# Patient Record
Sex: MALE | Race: WHITE | Employment: FULL TIME | URBAN - METROPOLITAN AREA
[De-identification: names, ages, dates, MRNs, and addresses within clinical notes are randomized per-mention and may not be internally consistent; named-entity substitution may affect disease eponyms.]

---

## 2017-01-05 RX ORDER — LEVOTHYROXINE SODIUM 0.05 MG/1
50 TABLET ORAL
Qty: 30 TABLET | Refills: 3 | Status: SHIPPED | OUTPATIENT
Start: 2017-01-05 | End: 2017-03-02 | Stop reason: DRUGHIGH

## 2017-01-06 ENCOUNTER — OFFICE VISIT (OUTPATIENT)
Dept: ENDOCRINOLOGY CLINIC | Facility: CLINIC | Age: 52
End: 2017-01-06

## 2017-01-06 VITALS
WEIGHT: 204.19 LBS | DIASTOLIC BLOOD PRESSURE: 71 MMHG | RESPIRATION RATE: 18 BRPM | HEART RATE: 78 BPM | TEMPERATURE: 99 F | BODY MASS INDEX: 32.82 KG/M2 | SYSTOLIC BLOOD PRESSURE: 114 MMHG | HEIGHT: 66 IN

## 2017-01-06 DIAGNOSIS — E06.3 HYPOTHYROIDISM DUE TO HASHIMOTO'S THYROIDITIS: ICD-10-CM

## 2017-01-06 DIAGNOSIS — E03.8 HYPOTHYROIDISM DUE TO HASHIMOTO'S THYROIDITIS: ICD-10-CM

## 2017-01-06 DIAGNOSIS — E08.00 DIABETES MELLITUS DUE TO UNDERLYING CONDITION WITH HYPEROSMOLARITY WITHOUT COMA, UNSPECIFIED LONG TERM INSULIN USE STATUS: Primary | ICD-10-CM

## 2017-01-06 LAB
GLUCOSE BLOOD: 195
TEST STRIP LOT #: NORMAL NUMERIC

## 2017-01-06 PROCEDURE — 36416 COLLJ CAPILLARY BLOOD SPEC: CPT | Performed by: INTERNAL MEDICINE

## 2017-01-06 PROCEDURE — 99214 OFFICE O/P EST MOD 30 MIN: CPT | Performed by: INTERNAL MEDICINE

## 2017-01-06 PROCEDURE — 82962 GLUCOSE BLOOD TEST: CPT | Performed by: INTERNAL MEDICINE

## 2017-01-06 RX ORDER — ESCITALOPRAM OXALATE 10 MG/1
10 TABLET ORAL DAILY
Refills: 0 | COMMUNITY
Start: 2017-01-01 | End: 2018-12-07

## 2017-01-06 NOTE — PROGRESS NOTES
Name: Alberto Beltran  Date: 1/6/2017    Referring Physician: No ref. provider found    HISTORY OF PRESENT ILLNESS   Alberto Beltran is a 46year old male who presents for diabetes mellitus.   He has history of chronic pancreatitis s/p multiple ERCP and tara tablet, Rfl: 3  •  Glucose Blood (CRISTIAN CONTOUR NEXT TEST) In Vitro Strip, Use to check blood sugar 5 times daily as directed, Disp: 450 each, Rfl: 0  •  CRISTIAN CONTOUR MONITOR W/DEVICE Does not apply Kit, Use to check blood sugar, Disp: 1 kit, Rfl: 0  •  B 1.0 oz/week       2 Glasses of wine per week       Comment: once/month    Drug Use: No            Other Topics            Concern  Caffeine Concern        Yes    Comment:6cups coffee        Medical History:   Past Medical History   Diagnosis Date and cardiovascular disease  -Discussed importance of SBGM  -Discussed importance of low CHO diet  -Adjusted insulin pump as noted above  -Discussed importance of bolus several times per day - overall improved compliance  -Normotensive  -labs improved, cont

## 2017-02-10 RX ORDER — INSULIN ASPART 100 [IU]/ML
INJECTION, SOLUTION INTRAVENOUS; SUBCUTANEOUS
Qty: 50 VIAL | Refills: 2 | Status: SHIPPED | OUTPATIENT
Start: 2017-02-10 | End: 2017-07-08

## 2017-02-27 ENCOUNTER — TELEPHONE (OUTPATIENT)
Dept: ENDOCRINOLOGY CLINIC | Facility: CLINIC | Age: 52
End: 2017-02-27

## 2017-02-27 DIAGNOSIS — E03.9 HYPOTHYROIDISM, UNSPECIFIED TYPE: Primary | ICD-10-CM

## 2017-02-27 NOTE — TELEPHONE ENCOUNTER
Please call patient - thyroid levels are still not at goal, increase LT4 to 75mcg PO daily and repeat TSH, FT4 in 2 months.

## 2017-03-02 ENCOUNTER — PATIENT MESSAGE (OUTPATIENT)
Dept: ENDOCRINOLOGY CLINIC | Facility: CLINIC | Age: 52
End: 2017-03-02

## 2017-03-02 RX ORDER — LEVOTHYROXINE SODIUM 0.07 MG/1
75 TABLET ORAL
Qty: 30 TABLET | Refills: 3 | Status: SHIPPED | OUTPATIENT
Start: 2017-03-02 | End: 2017-07-03

## 2017-03-02 NOTE — TELEPHONE ENCOUNTER
Have had trouble reaching patient. Sent [employer] message and will await reply before sending prescription. Will place orders for repeat labs in 2 months.

## 2017-03-15 ENCOUNTER — TELEPHONE (OUTPATIENT)
Dept: ENDOCRINOLOGY CLINIC | Facility: CLINIC | Age: 52
End: 2017-03-15

## 2017-03-15 NOTE — TELEPHONE ENCOUNTER
Called Mulugeta Dias and let him know recommendation below. He states pain is not severe at this time.  He will keep recommendation in mind but plans to contact Dr. Belgica Whiting office as well as he has seen them in the past. IF symptoms worsen he understands to go to E

## 2017-03-15 NOTE — TELEPHONE ENCOUNTER
We could do labs. but I would recommend going to the ER, especially since this is his second episode in less than 2 months.    yuliya

## 2017-03-15 NOTE — TELEPHONE ENCOUNTER
Regarding: RE: Non-Urgent Medical Question  Contact: [phone]  ----- Message from Jere Henning RN sent at 3/15/2017 12:07 PM CDT -----       ----- Message sent from Rosa Maria Gomez RN to Bret Cancino at 3/15/2017 11:56 AM -----   ARIN Cordoba

## 2017-04-20 ENCOUNTER — CHARTING TRANS (OUTPATIENT)
Dept: OTHER | Age: 52
End: 2017-04-20

## 2017-04-23 ENCOUNTER — CHARTING TRANS (OUTPATIENT)
Dept: OTHER | Age: 52
End: 2017-04-23

## 2017-05-30 RX ORDER — ATORVASTATIN CALCIUM 40 MG/1
TABLET, FILM COATED ORAL
Qty: 90 TABLET | Refills: 1 | Status: SHIPPED | OUTPATIENT
Start: 2017-05-30 | End: 2017-12-08

## 2017-05-30 RX ORDER — LISINOPRIL 40 MG/1
TABLET ORAL
Qty: 90 TABLET | Refills: 1 | Status: SHIPPED | OUTPATIENT
Start: 2017-05-30 | End: 2017-12-08

## 2017-07-05 RX ORDER — LEVOTHYROXINE SODIUM 0.07 MG/1
TABLET ORAL
Qty: 30 TABLET | Refills: 0 | Status: SHIPPED | OUTPATIENT
Start: 2017-07-05 | End: 2017-07-31

## 2017-07-08 ENCOUNTER — OFFICE VISIT (OUTPATIENT)
Dept: ENDOCRINOLOGY CLINIC | Facility: CLINIC | Age: 52
End: 2017-07-08

## 2017-07-08 VITALS
DIASTOLIC BLOOD PRESSURE: 80 MMHG | HEIGHT: 67 IN | SYSTOLIC BLOOD PRESSURE: 114 MMHG | BODY MASS INDEX: 31.08 KG/M2 | WEIGHT: 198 LBS | HEART RATE: 78 BPM

## 2017-07-08 DIAGNOSIS — E11.65 UNCONTROLLED TYPE 2 DIABETES MELLITUS WITH COMPLICATION, WITH LONG-TERM CURRENT USE OF INSULIN (HCC): Primary | ICD-10-CM

## 2017-07-08 DIAGNOSIS — Z79.4 UNCONTROLLED TYPE 2 DIABETES MELLITUS WITH COMPLICATION, WITH LONG-TERM CURRENT USE OF INSULIN (HCC): Primary | ICD-10-CM

## 2017-07-08 DIAGNOSIS — E11.8 UNCONTROLLED TYPE 2 DIABETES MELLITUS WITH COMPLICATION, WITH LONG-TERM CURRENT USE OF INSULIN (HCC): Primary | ICD-10-CM

## 2017-07-08 LAB
CARTRIDGE LOT#: ABNORMAL NUMERIC
GLUCOSE BLOOD: 130
HEMOGLOBIN A1C: 9.5 % (ref 4.3–5.6)
TEST STRIP LOT #: NORMAL NUMERIC

## 2017-07-08 PROCEDURE — 36416 COLLJ CAPILLARY BLOOD SPEC: CPT | Performed by: INTERNAL MEDICINE

## 2017-07-08 PROCEDURE — 99214 OFFICE O/P EST MOD 30 MIN: CPT | Performed by: INTERNAL MEDICINE

## 2017-07-08 PROCEDURE — 82962 GLUCOSE BLOOD TEST: CPT | Performed by: INTERNAL MEDICINE

## 2017-07-08 PROCEDURE — 99212 OFFICE O/P EST SF 10 MIN: CPT | Performed by: INTERNAL MEDICINE

## 2017-07-08 PROCEDURE — 83036 HEMOGLOBIN GLYCOSYLATED A1C: CPT | Performed by: INTERNAL MEDICINE

## 2017-07-08 NOTE — PROGRESS NOTES
Name: Cathy Nichols  Date: 7/8/2017    Referring Physician: No ref. provider found    HISTORY OF PRESENT ILLNESS   Cathy Nichols is a 46year old male who presents for diabetes mellitus.   He has history of chronic pancreatitis s/p multiple ERCP and tara FIVE TIMES DAILY, Disp: 200 strip, Rfl: 5  •  CRISTIAN CONTOUR NEXT TEST In Vitro Strip, CHECK BLOOD GLUCOSE LEVELS FIVE TIMES DAILY, Disp: 400 strip, Rfl: 3  •  escitalopram 10 MG Oral Tab, Take 10 mg by mouth daily.   , Disp: , Rfl: 0  •  Pancrelipase, Lip-P Packs/day: 0.00      Years: 0.00           Quit date: 8/1/2002    Alcohol use: Yes           1.0 oz/week       Glasses of wine: 2 per week       Comment: once/month    Drug use: No            Other Topics diabetes  -Discussed complications of diabetes include retinopathy, neuropathy, nephropathy and cardiovascular disease  -Discussed importance of SBGM  -Discussed importance of low CHO diet  -Continue current pump settings  -Discussed at length the perlita

## 2017-07-31 RX ORDER — LEVOTHYROXINE SODIUM 0.07 MG/1
TABLET ORAL
Qty: 30 TABLET | Refills: 3 | Status: SHIPPED | OUTPATIENT
Start: 2017-07-31 | End: 2017-12-16

## 2017-10-31 ENCOUNTER — TELEPHONE (OUTPATIENT)
Dept: ENDOCRINOLOGY CLINIC | Facility: CLINIC | Age: 52
End: 2017-10-31

## 2017-10-31 NOTE — TELEPHONE ENCOUNTER
Fax Machine is out of order. Called [employer]. Spoke with Hudson Alfaro in the supply department. He will send a message to blue that new fax number is [phone]. Will await form.

## 2017-10-31 NOTE — TELEPHONE ENCOUNTER
[employer] is refaxing CMN form today. On top of form under complete system, 670G needs to be marked off and initialed by Satish Aguilar. Please call. Aware SH out of office.

## 2017-11-01 NOTE — TELEPHONE ENCOUNTER
See previous Encounter this form was already also faxed on Monday but maybe [employer] did not receive. Await signature and will fax again.

## 2017-11-02 ENCOUNTER — TELEPHONE (OUTPATIENT)
Dept: ENDOCRINOLOGY CLINIC | Facility: CLINIC | Age: 52
End: 2017-11-02

## 2017-11-02 NOTE — TELEPHONE ENCOUNTER
(see clsd te:10/31) [employer] indicates section on form \"Select:Mini, needs innials\" Karla Babcock will refax and indicates more information is needed.  Any questions, pls call WO:[phone] [id number]

## 2017-12-08 RX ORDER — LISINOPRIL 40 MG/1
TABLET ORAL
Qty: 90 TABLET | Refills: 0 | Status: SHIPPED | OUTPATIENT
Start: 2017-12-08 | End: 2018-03-02

## 2017-12-08 RX ORDER — INSULIN LISPRO 100 [IU]/ML
INJECTION, SOLUTION INTRAVENOUS; SUBCUTANEOUS
Qty: 6 VIAL | Refills: 3 | Status: SHIPPED | OUTPATIENT
Start: 2017-12-08 | End: 2017-12-15

## 2017-12-08 RX ORDER — ATORVASTATIN CALCIUM 40 MG/1
TABLET, FILM COATED ORAL
Qty: 90 TABLET | Refills: 0 | Status: SHIPPED | OUTPATIENT
Start: 2017-12-08 | End: 2018-03-02

## 2017-12-08 NOTE — TELEPHONE ENCOUNTER
Current Outpatient Prescriptions:  ATORVASTATIN 40 MG Oral Tab TAKE 1 BY MOUTH NIGHTLY Disp: 90 tablet Rfl: 1   LISINOPRIL 40 MG Oral Tab TAKE 1 BY MOUTH DAILY Disp: 90 tablet Rfl: 1   Insulin Lispro, Human, (HUMALOG) 100 UNIT/ML Subcutaneous Solution In

## 2017-12-15 ENCOUNTER — TELEPHONE (OUTPATIENT)
Dept: ENDOCRINOLOGY CLINIC | Facility: CLINIC | Age: 52
End: 2017-12-15

## 2017-12-15 RX ORDER — INSULIN LISPRO 100 [IU]/ML
INJECTION, SOLUTION INTRAVENOUS; SUBCUTANEOUS
Qty: 6 VIAL | Refills: 0 | Status: SHIPPED | OUTPATIENT
Start: 2017-12-15 | End: 2017-12-18

## 2017-12-16 NOTE — TELEPHONE ENCOUNTER
LOV 7/8/17 RTC 3 months. No F/U scheduled. Letter sent that he is due for follow up. 1 month refill pending.

## 2017-12-16 NOTE — TELEPHONE ENCOUNTER
Out of insulin. States he had ordered insulin via retail pharmacy and it is not covered? ?  May be now novolog is on formulary?   I prescribed to retail maritza ROPER

## 2017-12-17 ENCOUNTER — PATIENT MESSAGE (OUTPATIENT)
Dept: ENDOCRINOLOGY CLINIC | Facility: CLINIC | Age: 52
End: 2017-12-17

## 2017-12-18 ENCOUNTER — TELEPHONE (OUTPATIENT)
Dept: ENDOCRINOLOGY CLINIC | Facility: CLINIC | Age: 52
End: 2017-12-18

## 2017-12-18 RX ORDER — LANCETS
EACH MISCELLANEOUS
Qty: 450 EACH | Refills: 0 | Status: SHIPPED | OUTPATIENT
Start: 2017-12-18 | End: 2018-02-26

## 2017-12-18 RX ORDER — INSULIN ASPART 100 [IU]/ML
INJECTION, SOLUTION INTRAVENOUS; SUBCUTANEOUS
Qty: 30 ML | Refills: 1 | Status: SHIPPED | OUTPATIENT
Start: 2017-12-18 | End: 2018-02-13

## 2017-12-18 RX ORDER — LEVOTHYROXINE SODIUM 0.07 MG/1
TABLET ORAL
Qty: 30 TABLET | Refills: 0 | Status: SHIPPED | OUTPATIENT
Start: 2017-12-18 | End: 2018-01-25

## 2017-12-18 NOTE — TELEPHONE ENCOUNTER
From: Maeve Verduzco  To: Sony Wyatt MD  Sent: 12/17/2017 4:49 AM CST  Subject: Non-Urgent Medical Question    I am now working mon-Friday and looking to book a Saturday apt but may be able to do it during the week depending upon your hours. ..can you g

## 2017-12-18 NOTE — TELEPHONE ENCOUNTER
Current Outpatient Prescriptions:  LEVOTHYROXINE SODIUM 75 MCG Oral Tab TAKE 1 TABLET(75 MCG) BY MOUTH BEFORE BREAKFAST Disp: 30 tablet Rfl: 0   insulin aspart (NOVOLOG) 100 UNIT/ML Subcutaneous Solution Inject via insulin pump.  Maximum 75 units daily Molly Ponce

## 2017-12-18 NOTE — TELEPHONE ENCOUNTER
Sent updated prescription for covered alternative Novolog per [facility] protocol.

## 2017-12-18 NOTE — TELEPHONE ENCOUNTER
Contacted [facility] given list below unclear what is needed. They are looking for refill for microlet lancets.

## 2017-12-19 ENCOUNTER — PATIENT MESSAGE (OUTPATIENT)
Dept: ENDOCRINOLOGY CLINIC | Facility: CLINIC | Age: 52
End: 2017-12-19

## 2017-12-19 DIAGNOSIS — E10.65 UNCONTROLLED TYPE 1 DIABETES MELLITUS WITH COMPLICATION (HCC): Primary | ICD-10-CM

## 2017-12-19 DIAGNOSIS — E10.8 UNCONTROLLED TYPE 1 DIABETES MELLITUS WITH COMPLICATION (HCC): Primary | ICD-10-CM

## 2017-12-19 NOTE — TELEPHONE ENCOUNTER
From: Aj Christie  To: Carol Leslie MD  Sent: 12/19/2017 4:46 AM CST  Subject: Non-Urgent Medical Question    Morning.  I have my physical apt set up with my GP on Jan 8, 2018 and was wondering if Dr Diego Hawkins could forward over any blood work she may want

## 2017-12-29 ENCOUNTER — PATIENT MESSAGE (OUTPATIENT)
Dept: ENDOCRINOLOGY CLINIC | Facility: CLINIC | Age: 52
End: 2017-12-29

## 2017-12-29 DIAGNOSIS — E10.8 CONTROLLED DIABETES MELLITUS TYPE 1 WITH COMPLICATIONS (HCC): Primary | ICD-10-CM

## 2017-12-29 NOTE — TELEPHONE ENCOUNTER
From: Charlotte Palma  To: Stephanie Salazar MD  Sent: 12/29/2017 10:10 AM CST  Subject: Referral Request    I have made a back up appointment with the diabetes training center for Jan 15th to get familiarized with my new 670G pump as Bhavani Gomes told me that someon

## 2018-01-15 ENCOUNTER — APPOINTMENT (OUTPATIENT)
Dept: ENDOCRINOLOGY | Facility: HOSPITAL | Age: 53
End: 2018-01-15
Attending: INTERNAL MEDICINE
Payer: COMMERCIAL

## 2018-01-25 ENCOUNTER — TELEPHONE (OUTPATIENT)
Dept: ENDOCRINOLOGY CLINIC | Facility: CLINIC | Age: 53
End: 2018-01-25

## 2018-01-25 RX ORDER — LEVOTHYROXINE SODIUM 0.07 MG/1
TABLET ORAL
Qty: 30 TABLET | Refills: 2 | Status: SHIPPED | OUTPATIENT
Start: 2018-01-25 | End: 2018-02-12

## 2018-01-25 NOTE — TELEPHONE ENCOUNTER
Asking if labs for blood work can be faxed to [facility] in [place] - phone [phone]- she does not have fax #

## 2018-02-10 ENCOUNTER — OFFICE VISIT (OUTPATIENT)
Dept: ENDOCRINOLOGY CLINIC | Facility: CLINIC | Age: 53
End: 2018-02-10

## 2018-02-10 VITALS
DIASTOLIC BLOOD PRESSURE: 73 MMHG | HEART RATE: 88 BPM | HEIGHT: 67 IN | BODY MASS INDEX: 31.86 KG/M2 | WEIGHT: 203 LBS | SYSTOLIC BLOOD PRESSURE: 116 MMHG

## 2018-02-10 DIAGNOSIS — E10.65 UNCONTROLLED TYPE 1 DIABETES MELLITUS WITH COMPLICATION (HCC): Primary | ICD-10-CM

## 2018-02-10 DIAGNOSIS — E10.8 UNCONTROLLED TYPE 1 DIABETES MELLITUS WITH COMPLICATION (HCC): Primary | ICD-10-CM

## 2018-02-10 LAB
CARTRIDGE LOT#: ABNORMAL NUMERIC
HEMOGLOBIN A1C: 9.3 % (ref 4.3–5.6)

## 2018-02-10 PROCEDURE — 99212 OFFICE O/P EST SF 10 MIN: CPT | Performed by: INTERNAL MEDICINE

## 2018-02-10 PROCEDURE — 83036 HEMOGLOBIN GLYCOSYLATED A1C: CPT | Performed by: INTERNAL MEDICINE

## 2018-02-10 PROCEDURE — 36416 COLLJ CAPILLARY BLOOD SPEC: CPT | Performed by: INTERNAL MEDICINE

## 2018-02-10 PROCEDURE — 99214 OFFICE O/P EST MOD 30 MIN: CPT | Performed by: INTERNAL MEDICINE

## 2018-02-10 NOTE — PROGRESS NOTES
Name: Mata Manzanares  Date: 2/10/2018    Referring Physician: No ref. provider found    HISTORY OF PRESENT ILLNESS   Mata Manzanares is a 45year old male who presents for diabetes mellitus.   He has history of chronic pancreatitis s/p multiple ERCP and causey ONCE TO TWICE DAILY AS DIRECTED, Disp: 450 each, Rfl: 0  •  atorvastatin 40 MG Oral Tab, TAKE 1 BY MOUTH NIGHTLY, Disp: 90 tablet, Rfl: 0  •  lisinopril 40 MG Oral Tab, TAKE 1 BY MOUTH DAILY, Disp: 90 tablet, Rfl: 0  •  CRISTIAN CONTOUR NEXT TEST In Vitro Str Comment:6cups coffee        Medical History:   Past Medical History:   Diagnosis Date   • Atrophic pancreas    • Diabetes mellitus ([facility] [address]    • Elevated liver function tests    • Non-alcoholic fatty liver disease 5216   • Non-alcoholic fatty liver disease 20 review  -Normotensive  -labs improved, continue statin  -Normal renal function  -UTD with optho    2.  Hypothyroidism  -New diagnosis  -Continue LT4 75mcg PO daily  -Normal TFTs    This is a 25 minute visit and greater than 50% of the time was spent

## 2018-02-12 RX ORDER — LEVOTHYROXINE SODIUM 0.07 MG/1
75 TABLET ORAL
Qty: 30 TABLET | Refills: 2 | Status: SHIPPED | OUTPATIENT
Start: 2018-02-12 | End: 2018-02-21

## 2018-02-12 NOTE — TELEPHONE ENCOUNTER
From: Candice Chaidez  Sent: 2/12/2018 5:22 AM CST  Subject: Medication Renewal Request    Grzegorz Quezada.  Wilmar Montelongo would like a refill of the following medications:     LEVOTHYROXINE SODIUM 75 MCG Oral Tab [Rachel Garcia MD]   Patient Comment: can I get a 90 day s

## 2018-02-13 ENCOUNTER — PATIENT MESSAGE (OUTPATIENT)
Dept: ENDOCRINOLOGY CLINIC | Facility: CLINIC | Age: 53
End: 2018-02-13

## 2018-02-13 RX ORDER — INSULIN LISPRO 100 [IU]/ML
INJECTION, SOLUTION INTRAVENOUS; SUBCUTANEOUS
Qty: 90 ML | Refills: 1 | Status: SHIPPED | OUTPATIENT
Start: 2018-02-13 | End: 2018-09-13

## 2018-02-13 NOTE — TELEPHONE ENCOUNTER
From: Amy May  To: Familia Echevarria MD  Sent: 2/13/2018 6:00 AM CST  Subject: Prescription Question    I am in need of more insulin however my new insurance now only covers Humalog.  I am starting my last vial of insulin on my next pump change so can D

## 2018-02-14 ENCOUNTER — TELEPHONE (OUTPATIENT)
Dept: ENDOCRINOLOGY CLINIC | Facility: CLINIC | Age: 53
End: 2018-02-14

## 2018-02-14 NOTE — TELEPHONE ENCOUNTER
Spoke with patient. We did send over 90 days of Humalog to the mail order yesterday. Patient is now aware and confirms this is correct. He will follow up with mail order and contact our office if any further issues.

## 2018-02-14 NOTE — TELEPHONE ENCOUNTER
Pt returned call, mayur martins at:[phone],thanks. Pt left contact info for rx:Humolog and # to call. Pt indicates his old ins covered, but the new ins covers rx:Humolog.

## 2018-02-21 ENCOUNTER — PATIENT MESSAGE (OUTPATIENT)
Dept: ENDOCRINOLOGY CLINIC | Facility: CLINIC | Age: 53
End: 2018-02-21

## 2018-02-21 RX ORDER — LEVOTHYROXINE SODIUM 0.07 MG/1
75 TABLET ORAL
Qty: 90 TABLET | Refills: 1 | Status: SHIPPED | OUTPATIENT
Start: 2018-02-21 | End: 2018-07-31

## 2018-02-21 NOTE — TELEPHONE ENCOUNTER
From: Juan Luis Muniz  To: Jake Ledesma MD  Sent: 2/21/2018 7:44 AM CST  Subject: Prescription Question    Hi, I know I [facility] Dr Tegan Leos to [facility] [address] [facility] but the request was too soon so they denied it elias

## 2018-02-22 ENCOUNTER — HOSPITAL (OUTPATIENT)
Dept: OTHER | Age: 53
End: 2018-02-22
Attending: INTERNAL MEDICINE

## 2018-02-22 LAB — GLUCOSE BLDC GLUCOMTR-MCNC: 105 MG/DL (ref 65–99)

## 2018-02-24 ENCOUNTER — PATIENT MESSAGE (OUTPATIENT)
Dept: ENDOCRINOLOGY CLINIC | Facility: CLINIC | Age: 53
End: 2018-02-24

## 2018-02-26 RX ORDER — LANCETS
EACH MISCELLANEOUS
Qty: 800 EACH | Refills: 1 | Status: SHIPPED | OUTPATIENT
Start: 2018-02-26 | End: 2018-10-20

## 2018-02-26 NOTE — TELEPHONE ENCOUNTER
From: Micah Mcintyre  To: Selvin Davidson MD  Sent: 2/24/2018 1:51 PM CST  Subject: Prescription Question    I'm SOOOOO SORRY. ...can she also authorize a 90 day supply of lancets too??? I have been going through those like shahbaz also. ...thanks, Rafael Moore

## 2018-02-26 NOTE — TELEPHONE ENCOUNTER
Patient requesting refill on kit contour next test strips and lancets to check up to 8 times per day. LOV 2/10/18  RTC 3 months  No F/U scheduled.

## 2018-02-26 NOTE — TELEPHONE ENCOUNTER
From: Kacey Zelaya  To: Will Kauffman MD  Sent: 2/24/2018 1:47 PM CST  Subject: Prescription Question    Can dr Cleve Parra refill at 90 day supply of my contour NEXT test strips?  Can you let her know that with the new 670G pump I have been testing so much la

## 2018-02-26 NOTE — TELEPHONE ENCOUNTER
Duplicate message. See other refill request from today. Order pended for lancets and strips to Dr. Bri Almanza.

## 2018-03-02 RX ORDER — ATORVASTATIN CALCIUM 40 MG/1
TABLET, FILM COATED ORAL
Qty: 90 TABLET | Refills: 1 | Status: SHIPPED
Start: 2018-03-02 | End: 2018-07-31

## 2018-03-02 RX ORDER — LISINOPRIL 40 MG/1
TABLET ORAL
Qty: 90 TABLET | Refills: 1 | Status: SHIPPED
Start: 2018-03-02 | End: 2018-07-31

## 2018-03-02 NOTE — TELEPHONE ENCOUNTER
From: Kacey Zelaya  Sent: 3/2/2018 8:06 AM CST  Subject: Medication Renewal Request    Evia Ganser.  Dionisio Hsieh would like a refill of the following medications:     atorvastatin 40 MG Oral Tab [Rachel Garcia MD]   Patient Comment: 90 days requested to [facility]

## 2018-03-10 ENCOUNTER — HOSPITAL (OUTPATIENT)
Dept: OTHER | Age: 53
End: 2018-03-10
Attending: INTERNAL MEDICINE

## 2018-03-10 ENCOUNTER — IMAGING SERVICES (OUTPATIENT)
Dept: OTHER | Age: 53
End: 2018-03-10

## 2018-03-16 ENCOUNTER — PATIENT MESSAGE (OUTPATIENT)
Dept: ENDOCRINOLOGY CLINIC | Facility: CLINIC | Age: 53
End: 2018-03-16

## 2018-03-16 NOTE — TELEPHONE ENCOUNTER
MIGDALIATCBAYLEE with patient. Unfortunately contacted insurance and now they state PA will be pending until Monday or Tuesday.  Concerned patient will run out of strips and want to see if he needs temporary supply of covered brand sent to local pharmacy to get him th

## 2018-03-21 NOTE — TELEPHONE ENCOUNTER
Contacted insurance again today to check status. PA has been approved. 3/5/2018-3/5/2019. Spoke with insurance but unfortunately claim still not processing. They are working to fix problem and will contact us within 24 hours with update.

## 2018-03-22 NOTE — TELEPHONE ENCOUNTER
Rec'd confirmation that Contour Next Test Strips approved until 3/5/2019 from [place]. Sent pt message to confirm pharmacy and how many test strips/day he requesting. Will await response before ordering. Strips need to be ordered asap.

## 2018-03-22 NOTE — TELEPHONE ENCOUNTER
Ordered test strips to [facility] for 800 strips per protocol (see patient's message via my chart).

## 2018-03-23 ENCOUNTER — PATIENT MESSAGE (OUTPATIENT)
Dept: ENDOCRINOLOGY CLINIC | Facility: CLINIC | Age: 53
End: 2018-03-23

## 2018-03-23 NOTE — TELEPHONE ENCOUNTER
From: Loyda Nogueira  To: Jannelle Cheadle, MD  Sent: 3/23/2018 6:29 AM CDT  Subject: Prescription Question    you can send it to [facility] to refill the test strips I needed. ...

## 2018-07-31 RX ORDER — LEVOTHYROXINE SODIUM 0.07 MG/1
TABLET ORAL
Qty: 90 TABLET | Refills: 1 | Status: SHIPPED | OUTPATIENT
Start: 2018-07-31 | End: 2018-09-07

## 2018-07-31 RX ORDER — LISINOPRIL 40 MG/1
TABLET ORAL
Qty: 90 TABLET | Refills: 1 | Status: SHIPPED | OUTPATIENT
Start: 2018-07-31 | End: 2019-05-04

## 2018-07-31 RX ORDER — ATORVASTATIN CALCIUM 40 MG/1
TABLET, FILM COATED ORAL
Qty: 90 TABLET | Refills: 1 | Status: SHIPPED | OUTPATIENT
Start: 2018-07-31 | End: 2019-05-04

## 2018-09-07 ENCOUNTER — OFFICE VISIT (OUTPATIENT)
Dept: ENDOCRINOLOGY CLINIC | Facility: CLINIC | Age: 53
End: 2018-09-07
Payer: COMMERCIAL

## 2018-09-07 VITALS
HEART RATE: 76 BPM | DIASTOLIC BLOOD PRESSURE: 75 MMHG | SYSTOLIC BLOOD PRESSURE: 128 MMHG | BODY MASS INDEX: 33 KG/M2 | WEIGHT: 210.38 LBS

## 2018-09-07 DIAGNOSIS — E10.65 UNCONTROLLED TYPE 1 DIABETES MELLITUS WITH COMPLICATION (HCC): Primary | ICD-10-CM

## 2018-09-07 DIAGNOSIS — E10.8 UNCONTROLLED TYPE 1 DIABETES MELLITUS WITH COMPLICATION (HCC): Primary | ICD-10-CM

## 2018-09-07 LAB
CARTRIDGE LOT#: ABNORMAL NUMERIC
GLUCOSE BLOOD: 166
HEMOGLOBIN A1C: 9.4 % (ref 4.3–5.6)
TEST STRIP LOT #: NORMAL NUMERIC

## 2018-09-07 PROCEDURE — 82962 GLUCOSE BLOOD TEST: CPT | Performed by: INTERNAL MEDICINE

## 2018-09-07 PROCEDURE — 36416 COLLJ CAPILLARY BLOOD SPEC: CPT | Performed by: INTERNAL MEDICINE

## 2018-09-07 PROCEDURE — 99212 OFFICE O/P EST SF 10 MIN: CPT | Performed by: INTERNAL MEDICINE

## 2018-09-07 PROCEDURE — 83036 HEMOGLOBIN GLYCOSYLATED A1C: CPT | Performed by: INTERNAL MEDICINE

## 2018-09-07 PROCEDURE — 99214 OFFICE O/P EST MOD 30 MIN: CPT | Performed by: INTERNAL MEDICINE

## 2018-09-07 RX ORDER — LEVOTHYROXINE SODIUM 0.07 MG/1
TABLET ORAL
Qty: 90 TABLET | Refills: 1 | Status: SHIPPED | OUTPATIENT
Start: 2018-09-07 | End: 2019-05-04

## 2018-09-07 NOTE — PROGRESS NOTES
Name: Maeve Verduzco  Date: 9/7/2018    Referring Physician: No ref. provider found    HISTORY OF PRESENT ILLNESS   Maeve Verduzco is a 45year old male who presents for diabetes mellitus.   He has history of chronic pancreatitis s/p multiple ERCP and tara 1  •  ATORVASTATIN 40 MG Oral Tab, TAKE 1 TABLET BY MOUTH  NIGHTLY, Disp: 90 tablet, Rfl: 1  •  Glucose Blood In Vitro Strip, Use to check blood sugar 8 times daily as directed, Disp: 800 each, Rfl: 1  •  CRISTIAN CONTOUR NEXT TEST In Vitro Strip, Use test st Comment: once/month    Drug use: No            Other Topics            Concern  Caffeine Concern        Yes    Comment:6cups coffee        Medical History:   Past Medical History:   Diagnosis Date   • Atrophic pancreas    • Diabetes mellitus ([facility] [address]    • Clair kicked out, call in 2 weeks to review    -Discussed at length the importance of regular bolus   -Normotensive  -labs improved, continue statin  -Normal renal function  -UTD with optho    2.  Hypothyroidism  -New diagnosis  -Continue LT4 75mcg PO daily  -Nor

## 2018-09-13 RX ORDER — INSULIN LISPRO 100 [IU]/ML
INJECTION, SOLUTION INTRAVENOUS; SUBCUTANEOUS
Qty: 120 ML | Refills: 1 | Status: SHIPPED | OUTPATIENT
Start: 2018-09-13 | End: 2019-05-04

## 2018-09-20 ENCOUNTER — PATIENT MESSAGE (OUTPATIENT)
Dept: ENDOCRINOLOGY CLINIC | Facility: CLINIC | Age: 53
End: 2018-09-20

## 2018-09-20 DIAGNOSIS — R53.83 FATIGUE, UNSPECIFIED TYPE: Primary | ICD-10-CM

## 2018-09-20 NOTE — TELEPHONE ENCOUNTER
Ok to add Vitamin B12 and Vitamin D. We don't usually check Vitamin K.  Yes, fasting however he can have juice in the morning before labs.

## 2018-09-20 NOTE — TELEPHONE ENCOUNTER
From: Scott Blackburn  To: Mary Mireles MD  Sent: 9/20/2018 6:03 AM CDT  Subject: Visit Follow-up Question    Dr Ana Meyer, I am getting my blood drawn tomorrow morning at 8 am Do I fast 12 hours before?  Also, I noticed the blood work you ordered did not have

## 2018-09-24 ENCOUNTER — PATIENT MESSAGE (OUTPATIENT)
Dept: ENDOCRINOLOGY CLINIC | Facility: CLINIC | Age: 53
End: 2018-09-24

## 2018-09-24 NOTE — TELEPHONE ENCOUNTER
From: Mata Manzanares  To: Kaden Lees MD  Sent: 9/24/2018 12:39 PM CDT  Subject: Non-Urgent Medical Question    I am trying to set up an appointment with a nutritionist near my house and they asked for a letter from my doctor stating that is something I

## 2018-09-24 NOTE — TELEPHONE ENCOUNTER
Dr Bri Almanza, I have pended a letter for you per pt's request for nutrition tx. Also, printed a hard copy for you. Please review and advise. I will be happy to fax after your review/changes. Thanks.

## 2018-10-12 ENCOUNTER — CHARTING TRANS (OUTPATIENT)
Dept: OTHER | Age: 53
End: 2018-10-12

## 2018-10-12 ENCOUNTER — HOSPITAL (OUTPATIENT)
Dept: OTHER | Age: 53
End: 2018-10-12

## 2018-10-22 ENCOUNTER — HOSPITAL (OUTPATIENT)
Dept: OTHER | Age: 53
End: 2018-10-22

## 2018-10-22 RX ORDER — BLOOD SUGAR DIAGNOSTIC
STRIP MISCELLANEOUS
Qty: 800 STRIP | Refills: 1 | Status: SHIPPED | OUTPATIENT
Start: 2018-10-22 | End: 2019-12-09

## 2018-12-04 ENCOUNTER — PATIENT MESSAGE (OUTPATIENT)
Dept: ENDOCRINOLOGY CLINIC | Facility: CLINIC | Age: 53
End: 2018-12-04

## 2018-12-04 NOTE — TELEPHONE ENCOUNTER
From: Tono Hassan  To: Michell Honeycutt MD  Sent: 12/4/2018 5:38 AM CST  Subject: Other    Good morning.  I have an apt this Friday and am meeting with my GP also later that day for a physical. Dr Moses Speaks did bloodwork in the past and I was wondering if I can ei

## 2018-12-07 ENCOUNTER — OFFICE VISIT (OUTPATIENT)
Dept: ENDOCRINOLOGY CLINIC | Facility: CLINIC | Age: 53
End: 2018-12-07
Payer: COMMERCIAL

## 2018-12-07 VITALS
HEART RATE: 80 BPM | SYSTOLIC BLOOD PRESSURE: 119 MMHG | BODY MASS INDEX: 33 KG/M2 | DIASTOLIC BLOOD PRESSURE: 73 MMHG | WEIGHT: 211.19 LBS

## 2018-12-07 DIAGNOSIS — E10.65 UNCONTROLLED TYPE 1 DIABETES MELLITUS WITH HYPERGLYCEMIA (HCC): Primary | ICD-10-CM

## 2018-12-07 PROCEDURE — 99212 OFFICE O/P EST SF 10 MIN: CPT | Performed by: INTERNAL MEDICINE

## 2018-12-07 PROCEDURE — 82962 GLUCOSE BLOOD TEST: CPT | Performed by: INTERNAL MEDICINE

## 2018-12-07 PROCEDURE — 99214 OFFICE O/P EST MOD 30 MIN: CPT | Performed by: INTERNAL MEDICINE

## 2018-12-07 PROCEDURE — 95251 CONT GLUC MNTR ANALYSIS I&R: CPT | Performed by: INTERNAL MEDICINE

## 2018-12-07 PROCEDURE — 83036 HEMOGLOBIN GLYCOSYLATED A1C: CPT | Performed by: INTERNAL MEDICINE

## 2018-12-07 PROCEDURE — 36416 COLLJ CAPILLARY BLOOD SPEC: CPT | Performed by: INTERNAL MEDICINE

## 2018-12-07 RX ORDER — DULOXETIN HYDROCHLORIDE 60 MG/1
60 CAPSULE, DELAYED RELEASE ORAL DAILY
COMMUNITY
Start: 2018-10-03

## 2018-12-07 NOTE — PROGRESS NOTES
Name: Jorgito Combs  Date: 9/7/2018    Referring Physician: No ref. provider found    HISTORY OF PRESENT ILLNESS   Jorgito Combs is a 45year old male who presents for diabetes mellitus.   He has history of chronic pancreatitis s/p multiple ERCP and tara Insulin Lispro (HUMALOG) 100 UNIT/ML Subcutaneous Solution, INJECT VIA INSULIN PUMP.   MAXIMUM 125 UNITS DAILY, Disp: 120 mL, Rfl: 1  •  Levothyroxine Sodium 75 MCG Oral Tab, TAKE 1 TABLET BY MOUTH  DAILY BEFORE BREAKFAST, Disp: 90 tablet, Rfl: 1  •  LISINO Concern: Yes          6cups coffee      Medical History:   Past Medical History:   Diagnosis Date   • Atrophic pancreas    • Diabetes mellitus ([facility] [address]    • Elevated liver function tests    • Non-alcoholic fatty liver disease 0953   • Non-alcoholic fatty live function  -UTD with optho    2. Hypothyroidism  -New diagnosis  -Continue LT4 75mcg PO daily  -Normal TFTs    Continuous Glucose Monitoring Interpretation    Renny Wild has undergone continuous glucose monitoring with the personal [email or web address] CGM.   His

## 2019-05-04 RX ORDER — ATORVASTATIN CALCIUM 40 MG/1
TABLET, FILM COATED ORAL
Qty: 90 TABLET | Refills: 1 | Status: SHIPPED | OUTPATIENT
Start: 2019-05-04 | End: 2019-10-18

## 2019-05-04 RX ORDER — LISINOPRIL 40 MG/1
TABLET ORAL
Qty: 90 TABLET | Refills: 1 | Status: SHIPPED | OUTPATIENT
Start: 2019-05-04 | End: 2019-10-18

## 2019-05-04 RX ORDER — LEVOTHYROXINE SODIUM 0.07 MG/1
TABLET ORAL
Qty: 90 TABLET | Refills: 1 | Status: SHIPPED | OUTPATIENT
Start: 2019-05-04 | End: 2019-10-18

## 2019-05-04 RX ORDER — INSULIN LISPRO 100 [IU]/ML
INJECTION, SOLUTION INTRAVENOUS; SUBCUTANEOUS
Qty: 120 ML | Refills: 1 | Status: SHIPPED | OUTPATIENT
Start: 2019-05-04 | End: 2020-01-13

## 2019-05-20 ENCOUNTER — PATIENT MESSAGE (OUTPATIENT)
Dept: ENDOCRINOLOGY CLINIC | Facility: CLINIC | Age: 54
End: 2019-05-20

## 2019-05-20 NOTE — TELEPHONE ENCOUNTER
From: Charlotte Palma  To: Stephanie Salazar MD  Sent: 5/20/2019 7:07 AM CDT  Subject: Non-Urgent Medical Question    Hi, I received your message that you're canceling my appt  with Dr Mendel Downy on July 5, 2019.  I will have to check my   chedule as it is very busy

## 2019-07-24 ENCOUNTER — TELEPHONE (OUTPATIENT)
Dept: ENDOCRINOLOGY CLINIC | Facility: CLINIC | Age: 54
End: 2019-07-24

## 2019-07-24 NOTE — TELEPHONE ENCOUNTER
Addended by: LAURI BEAULIEU on: 3/16/2018 02:22 PM     Modules accepted: Orders     Karen/Asaf inquiring if office received CMN for transmitter. Please call if office did not receive CMN.  [phone] [id number]

## 2019-09-27 ENCOUNTER — OFFICE VISIT (OUTPATIENT)
Dept: ENDOCRINOLOGY CLINIC | Facility: CLINIC | Age: 54
End: 2019-09-27
Payer: COMMERCIAL

## 2019-09-27 VITALS
DIASTOLIC BLOOD PRESSURE: 74 MMHG | HEART RATE: 95 BPM | SYSTOLIC BLOOD PRESSURE: 122 MMHG | BODY MASS INDEX: 32 KG/M2 | WEIGHT: 204.81 LBS

## 2019-09-27 DIAGNOSIS — E10.65 UNCONTROLLED TYPE 1 DIABETES MELLITUS WITH HYPERGLYCEMIA (HCC): Primary | ICD-10-CM

## 2019-09-27 LAB
CARTRIDGE LOT#: ABNORMAL NUMERIC
GLUCOSE BLOOD: 143
HEMOGLOBIN A1C: 8.4 % (ref 4.3–5.6)
TEST STRIP LOT #: NORMAL NUMERIC

## 2019-09-27 PROCEDURE — 83036 HEMOGLOBIN GLYCOSYLATED A1C: CPT | Performed by: INTERNAL MEDICINE

## 2019-09-27 PROCEDURE — 36416 COLLJ CAPILLARY BLOOD SPEC: CPT | Performed by: INTERNAL MEDICINE

## 2019-09-27 PROCEDURE — 82962 GLUCOSE BLOOD TEST: CPT | Performed by: INTERNAL MEDICINE

## 2019-09-27 PROCEDURE — 99214 OFFICE O/P EST MOD 30 MIN: CPT | Performed by: INTERNAL MEDICINE

## 2019-09-27 NOTE — PROGRESS NOTES
Name: Alberto Beltran  Date: 9/27/2019    Referring Physician: No ref. provider found    HISTORY OF PRESENT ILLNESS   Alberto Beltran is a 52year old male who presents for diabetes mellitus.   He has history of chronic pancreatitis s/p multiple ERCP and causey Tab, TAKE 1 TABLET BY MOUTH  DAILY BEFORE BREAKFAST, Disp: 90 tablet, Rfl: 1  •  Insulin Lispro (HUMALOG) 100 UNIT/ML Subcutaneous Solution, INJECT SUBCUTANEOUSLY  MAXIMUM 125 UNITS DAILY VIA INSULIN PUMP, Disp: 120 mL, Rfl: 1  •  DULoxetine HCl 60 MG Oral Alcohol/week: 1.7 standard drinks        Types: 2 Glasses of wine per week        Comment: once/month      Drug use: No    Other Topics      Concerns:        Caffeine Concern: Yes          6cups coffee      Medical History:   Past Medical History:   Sade Gonzales disease  -Discussed importance of SBGM  -Discussed importance of low CHO diet  -Continue current pump settings     -Discussed if he is in cycle of rechecking sugar then turn off auto mode and restart   -Discussed at length the importance of regular bolus

## 2019-10-14 ENCOUNTER — TELEPHONE (OUTPATIENT)
Dept: SCHEDULING | Age: 54
End: 2019-10-14

## 2019-10-19 RX ORDER — LISINOPRIL 40 MG/1
TABLET ORAL
Qty: 90 TABLET | Refills: 0 | Status: SHIPPED | OUTPATIENT
Start: 2019-10-19 | End: 2020-01-13

## 2019-10-19 RX ORDER — LEVOTHYROXINE SODIUM 0.07 MG/1
TABLET ORAL
Qty: 90 TABLET | Refills: 0 | Status: SHIPPED | OUTPATIENT
Start: 2019-10-19 | End: 2020-01-13

## 2019-10-19 RX ORDER — ATORVASTATIN CALCIUM 40 MG/1
TABLET, FILM COATED ORAL
Qty: 90 TABLET | Refills: 0 | Status: SHIPPED | OUTPATIENT
Start: 2019-10-19 | End: 2020-01-13

## 2019-10-28 ENCOUNTER — HOSPITAL (OUTPATIENT)
Dept: OTHER | Age: 54
End: 2019-10-28
Attending: SPECIALIST

## 2019-11-09 ENCOUNTER — PATIENT MESSAGE (OUTPATIENT)
Dept: ENDOCRINOLOGY CLINIC | Facility: CLINIC | Age: 54
End: 2019-11-09

## 2019-11-11 RX ORDER — BLOOD-GLUCOSE METER, WIRELESS
1 KIT MISCELLANEOUS DAILY
Qty: 1 KIT | Refills: 0 | Status: SHIPPED | OUTPATIENT
Start: 2019-11-11

## 2019-11-11 NOTE — TELEPHONE ENCOUNTER
From: Bret Cancino  To: Polly Rosenbaum MD  Sent: 11/9/2019 6:37 AM CST  Subject: Prescription Question    Can dr David Jang call in a RX for a new glucometer for me. I use contour next. It sends the reading directly to my pump.  It’s the [facility] on

## 2019-11-22 ENCOUNTER — PATIENT MESSAGE (OUTPATIENT)
Dept: ENDOCRINOLOGY CLINIC | Facility: CLINIC | Age: 54
End: 2019-11-22

## 2019-11-22 NOTE — TELEPHONE ENCOUNTER
From: Rui Gazra MD  To: Stephanie Combs  Sent: 11/22/2019 2:35 PM CST  Subject: Testosterone results    Good Afternoon,     Your repeat testosterone levels were abnormal including the bioavailable lab.  You would be a candidate for testosterone replaceme

## 2019-12-02 RX ORDER — TESTOSTERONE 40.5 MG/2.5G
40.5 GEL TOPICAL DAILY
Qty: 1 BOX | Refills: 3 | Status: SHIPPED | OUTPATIENT
Start: 2019-12-02 | End: 2019-12-09

## 2019-12-03 ENCOUNTER — TELEPHONE (OUTPATIENT)
Dept: ENDOCRINOLOGY CLINIC | Facility: CLINIC | Age: 54
End: 2019-12-03

## 2019-12-03 NOTE — TELEPHONE ENCOUNTER
GO TO [employer]      Current Outpatient Medications:   •  Testosterone (ANDROGEL) 40.5 MG/2.5GM (1.62%) Transdermal Gel, Place 40.5 mg onto the skin daily. , Disp: 1 Box, Rfl: 3

## 2019-12-09 ENCOUNTER — PATIENT MESSAGE (OUTPATIENT)
Dept: ENDOCRINOLOGY CLINIC | Facility: CLINIC | Age: 54
End: 2019-12-09

## 2019-12-09 NOTE — TELEPHONE ENCOUNTER
Pended scripts to preferred pharmacy. Replied to patient letting him know PA for T gel is still pending.

## 2019-12-09 NOTE — TELEPHONE ENCOUNTER
From: Woo Boone  To: Alyssa Shah MD  Sent: 12/9/2019 7:33 AM CST  Subject: Prescription Question    Hi, the contour next meter and the testosterone gel has been sent but I haven't received either.  Also, [facility]

## 2019-12-09 NOTE — TELEPHONE ENCOUNTER
Received fax from [address] [place] [address],[address] stating they received PA for medication coverage on 12/05/2019.  ID #: [id number]

## 2019-12-10 RX ORDER — BLOOD-GLUCOSE METER
1 EACH MISCELLANEOUS DAILY
Qty: 1 KIT | Refills: 0 | Status: SHIPPED | OUTPATIENT
Start: 2019-12-10

## 2019-12-11 RX ORDER — TESTOSTERONE 40.5 MG/2.5G
40.5 GEL TOPICAL DAILY
Qty: 1 BOX | Refills: 3 | Status: SHIPPED | OUTPATIENT
Start: 2019-12-11

## 2019-12-19 ENCOUNTER — TELEPHONE (OUTPATIENT)
Dept: ENDOCRINOLOGY CLINIC | Facility: CLINIC | Age: 54
End: 2019-12-19

## 2019-12-19 NOTE — TELEPHONE ENCOUNTER
Current Outpatient Medications   Medication Sig Dispense Refill   • Glucose Blood (CONTOUR NEXT TEST) In Vitro Strip USE TO CHECK BLOOD SUGAR 8  TIMES DAILY AS DIRECTED 800 strip 1     PA request call [phone] ID# [id number]

## 2019-12-19 NOTE — TELEPHONE ENCOUNTER
Patient uses a [employer] pump.    [facility]: [id number]  Contour Next Test strips  Status: Sent to Plan    Created: December 19th, 2019    Sent: December 19th, 2019

## 2019-12-20 NOTE — TELEPHONE ENCOUNTER
Please let patient know that he will need to see urology or PCP for prostate exam.  Then can change to Testim gel. Thanks.

## 2019-12-20 NOTE — TELEPHONE ENCOUNTER
Request for testosterone gel 1.62 % has been denied because \"we did not have evidence showing that you met criteria for approval:    -failure, intolerance or contraindication to preferred testosterone product - brand Testim or generic Testim (testosterone

## 2020-01-02 ENCOUNTER — TELEPHONE (OUTPATIENT)
Dept: GASTROENTEROLOGY | Age: 55
End: 2020-01-02

## 2020-01-02 RX ORDER — LISINOPRIL 40 MG/1
TABLET ORAL
COMMUNITY
Start: 2019-11-17

## 2020-01-02 RX ORDER — LEVOTHYROXINE SODIUM 0.07 MG/1
TABLET ORAL
COMMUNITY
Start: 2019-11-17

## 2020-01-02 RX ORDER — ATORVASTATIN CALCIUM 40 MG/1
TABLET, FILM COATED ORAL
COMMUNITY
Start: 2019-11-17

## 2020-01-02 RX ORDER — BLOOD-GLUCOSE METER
EACH MISCELLANEOUS
Refills: 0 | COMMUNITY
Start: 2019-12-10

## 2020-01-02 RX ORDER — INSULIN LISPRO 100 [IU]/ML
INJECTION, SOLUTION INTRAVENOUS; SUBCUTANEOUS
COMMUNITY
Start: 2019-11-17

## 2020-01-02 RX ORDER — DULOXETIN HYDROCHLORIDE 60 MG/1
CAPSULE, DELAYED RELEASE ORAL DAILY
Refills: 1 | COMMUNITY
Start: 2019-10-25

## 2020-01-13 RX ORDER — ATORVASTATIN CALCIUM 40 MG/1
TABLET, FILM COATED ORAL
Qty: 90 TABLET | Refills: 1 | Status: SHIPPED | OUTPATIENT
Start: 2020-01-13 | End: 2020-07-01

## 2020-01-13 RX ORDER — INSULIN LISPRO 100 [IU]/ML
INJECTION, SOLUTION INTRAVENOUS; SUBCUTANEOUS
Qty: 120 ML | Refills: 1 | Status: SHIPPED | OUTPATIENT
Start: 2020-01-13 | End: 2020-07-01

## 2020-01-13 RX ORDER — LISINOPRIL 40 MG/1
TABLET ORAL
Qty: 90 TABLET | Refills: 1 | Status: SHIPPED | OUTPATIENT
Start: 2020-01-13 | End: 2020-07-01

## 2020-01-13 RX ORDER — LEVOTHYROXINE SODIUM 0.07 MG/1
TABLET ORAL
Qty: 90 TABLET | Refills: 1 | Status: SHIPPED | OUTPATIENT
Start: 2020-01-13 | End: 2020-07-01

## 2020-02-04 ENCOUNTER — WALK IN (OUTPATIENT)
Dept: URGENT CARE | Age: 55
End: 2020-02-04

## 2020-02-04 VITALS
BODY MASS INDEX: 30.62 KG/M2 | HEART RATE: 102 BPM | OXYGEN SATURATION: 96 % | SYSTOLIC BLOOD PRESSURE: 120 MMHG | HEIGHT: 68 IN | RESPIRATION RATE: 12 BRPM | TEMPERATURE: 99.9 F | WEIGHT: 202 LBS | DIASTOLIC BLOOD PRESSURE: 80 MMHG

## 2020-02-04 DIAGNOSIS — B34.9 VIRAL ILLNESS: Primary | ICD-10-CM

## 2020-02-04 LAB
FLUAV AG UPPER RESP QL IA.RAPID: NEGATIVE
FLUBV AG UPPER RESP QL IA.RAPID: NEGATIVE

## 2020-02-04 PROCEDURE — 99213 OFFICE O/P EST LOW 20 MIN: CPT | Performed by: NURSE PRACTITIONER

## 2020-02-04 PROCEDURE — 87804 INFLUENZA ASSAY W/OPTIC: CPT | Performed by: NURSE PRACTITIONER

## 2020-02-04 ASSESSMENT — ENCOUNTER SYMPTOMS
APPETITE CHANGE: 0
HEADACHES: 1
TREMORS: 0
BACK PAIN: 0
FEVER: 1
SORE THROAT: 0
DIZZINESS: 0
FATIGUE: 1
DIAPHORESIS: 1
LIGHT-HEADEDNESS: 0
GASTROINTESTINAL NEGATIVE: 1
NUMBNESS: 0
FACIAL ASYMMETRY: 0
SPEECH DIFFICULTY: 0
RESPIRATORY NEGATIVE: 1
HEMATOLOGIC/LYMPHATIC NEGATIVE: 1
CHILLS: 1
SEIZURES: 0
ACTIVITY CHANGE: 1
EYES NEGATIVE: 1
RHINORRHEA: 1
VOICE CHANGE: 0

## 2020-03-18 ENCOUNTER — PATIENT MESSAGE (OUTPATIENT)
Dept: ENDOCRINOLOGY CLINIC | Facility: CLINIC | Age: 55
End: 2020-03-18

## 2020-03-18 NOTE — TELEPHONE ENCOUNTER
From: Edith Juarez  To: Jese Becerril MD  Sent: 3/18/2020 7:55 AM CDT  Subject: Other    cont. ...... 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 Just wanted your thoughts?  I know what other symptoms to look for but we have had a few students out of the coun

## 2020-04-10 ENCOUNTER — PATIENT MESSAGE (OUTPATIENT)
Dept: ENDOCRINOLOGY CLINIC | Facility: CLINIC | Age: 55
End: 2020-04-10

## 2020-04-10 NOTE — TELEPHONE ENCOUNTER
From: Nehemias Dutton  To: Darien Sutton MD  Sent: 4/10/2020 7:09 AM CDT  Subject: Prescription Question    Hi dr Elisa Retana. I hope you and your staff and family are all safe. I am starting to get low on my test strips. Can you authorize for a refill?  I believe

## 2020-04-17 NOTE — TELEPHONE ENCOUNTER
PA approved for Contour Next Test Strips through . Kyara Connelly [address] of [address] [facility]. Approved through 4/16/2021. [facility] sent to patient to inform.

## 2020-04-30 ENCOUNTER — PATIENT MESSAGE (OUTPATIENT)
Dept: ENDOCRINOLOGY CLINIC | Facility: CLINIC | Age: 55
End: 2020-04-30

## 2020-04-30 NOTE — TELEPHONE ENCOUNTER
From: Delicia Flynn  To: Damien Bah MD  Sent: 4/30/2020 6:04 AM CDT  Subject: Prescription Question    Morning. I'm checking with you because I never received my Contour Next test strips and lancets. I believe you had contacted them a few weeks ago.  Luis Carlos Oropeza

## 2020-07-01 RX ORDER — LISINOPRIL 40 MG/1
TABLET ORAL
Qty: 30 TABLET | Refills: 0 | Status: SHIPPED | OUTPATIENT
Start: 2020-07-01 | End: 2020-08-25

## 2020-07-01 RX ORDER — ATORVASTATIN CALCIUM 40 MG/1
TABLET, FILM COATED ORAL
Qty: 30 TABLET | Refills: 0 | Status: SHIPPED | OUTPATIENT
Start: 2020-07-01 | End: 2020-08-25

## 2020-07-01 RX ORDER — LEVOTHYROXINE SODIUM 0.07 MG/1
TABLET ORAL
Qty: 30 TABLET | Refills: 0 | Status: SHIPPED | OUTPATIENT
Start: 2020-07-01 | End: 2020-11-09

## 2020-07-01 RX ORDER — INSULIN LISPRO 100 [IU]/ML
INJECTION, SOLUTION INTRAVENOUS; SUBCUTANEOUS
Qty: 120 ML | Refills: 1 | Status: SHIPPED | OUTPATIENT
Start: 2020-07-01 | End: 2020-12-28

## 2020-07-29 ENCOUNTER — TELEPHONE (OUTPATIENT)
Dept: ENDOCRINOLOGY CLINIC | Facility: CLINIC | Age: 55
End: 2020-07-29

## 2020-07-29 NOTE — TELEPHONE ENCOUNTER
prescription request received from [employer] regarding pump and diabetic supplies. Form prefilled     Placed on Dr. Yayo Yuan desk for review and signature.

## 2020-08-24 RX ORDER — LANCETS
EACH MISCELLANEOUS
Qty: 800 EACH | Refills: 3 | Status: SHIPPED | OUTPATIENT
Start: 2020-08-24

## 2020-08-25 RX ORDER — ATORVASTATIN CALCIUM 40 MG/1
TABLET, FILM COATED ORAL
Qty: 30 TABLET | Refills: 11 | Status: SHIPPED | OUTPATIENT
Start: 2020-08-25

## 2020-08-25 RX ORDER — LISINOPRIL 40 MG/1
TABLET ORAL
Qty: 30 TABLET | Refills: 11 | Status: SHIPPED | OUTPATIENT
Start: 2020-08-25

## 2020-10-29 ENCOUNTER — TELEPHONE (OUTPATIENT)
Dept: ENDOCRINOLOGY CLINIC | Facility: CLINIC | Age: 55
End: 2020-10-29

## 2020-10-29 NOTE — TELEPHONE ENCOUNTER
Pt reached out to [employer] to get quote info about changing pumps from [employer] to [employer]. Max from [facility] dropped off CMN form - requesting it to be complete by provider. MONTEZ - RN reached out to pt to book apt but left a message to call back.      P

## 2020-11-09 RX ORDER — LEVOTHYROXINE SODIUM 0.07 MG/1
TABLET ORAL
Qty: 30 TABLET | Refills: 0 | Status: SHIPPED | OUTPATIENT
Start: 2020-11-09 | End: 2020-12-07

## 2020-11-14 ENCOUNTER — TELEPHONE (OUTPATIENT)
Dept: SCHEDULING | Age: 55
End: 2020-11-14

## 2020-11-16 DIAGNOSIS — G47.33 OBSTRUCTIVE SLEEP APNEA (ADULT) (PEDIATRIC): Primary | ICD-10-CM

## 2020-11-16 NOTE — TELEPHONE ENCOUNTER
Received fax from [address]: Your patient Justina Cuadra has contacted [employer] and is interested in pursuing coverage for their DASH Pods.  We have have three (3) unsuccessful attempts to contact your office to process this request. To obtain coverage for D

## 2020-11-17 ENCOUNTER — PATIENT MESSAGE (OUTPATIENT)
Dept: ENDOCRINOLOGY CLINIC | Facility: CLINIC | Age: 55
End: 2020-11-17

## 2020-11-18 NOTE — TELEPHONE ENCOUNTER
See TE 11/12/20. RN left message to confirm patient is unsatisfied with Omnipod and considering switch to Tandem. MyChart message sent.

## 2020-11-18 NOTE — TELEPHONE ENCOUNTER
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 ..sorry. ...can't always access my phone during patient care. .. please le

## 2020-11-19 NOTE — TELEPHONE ENCOUNTER
Closing this encounter as patient stated he will talk to Dr. Lul Simmons about pump issue at his upcoming appointment next month.      Future Appointments   Date Time Provider Johny Tapia   12/10/2020  1:15 PM Bertin Palacio MD [address] [place] [address]

## 2020-11-20 ENCOUNTER — PATIENT MESSAGE (OUTPATIENT)
Dept: ENDOCRINOLOGY CLINIC | Facility: CLINIC | Age: 55
End: 2020-11-20

## 2020-11-20 NOTE — TELEPHONE ENCOUNTER
From: Candice Chaidez  To: Jose Raul Church MD  Sent: 11/20/2020 7:12 AM CST  Subject: Non-Urgent Medical Question    Morning. [facility] [place]. .. [facility] I have an apt with Dr Kenneth Kang on December 10th at 115 pm.....if, by chance, something opens up earlier that day I can c

## 2020-11-20 NOTE — TELEPHONE ENCOUNTER
Pt asking to be seen earlier in day on 12/10 if possible - he will come either way.     Please advise

## 2020-11-20 NOTE — TELEPHONE ENCOUNTER
Please refer to [email or web address] message 11/17/20. Patient would like to discuss this further at his upcoming appointment with Dr. Blair Campos.      Future Appointments   Date Time Provider Johny Tapia   12/10/2020  1:15 PM Joan Thomas MD [address] [place] [address]

## 2020-11-24 ENCOUNTER — APPOINTMENT (OUTPATIENT)
Dept: SLEEP MEDICINE | Age: 55
End: 2020-11-24
Attending: NURSE PRACTITIONER

## 2020-12-01 ENCOUNTER — PATIENT MESSAGE (OUTPATIENT)
Dept: ENDOCRINOLOGY CLINIC | Facility: CLINIC | Age: 55
End: 2020-12-01

## 2020-12-01 NOTE — TELEPHONE ENCOUNTER
From: Tono Hassan  To: Laurent Navas MD  Sent: 12/1/2020 12:05 PM CST  Subject: Other    I can do 945 am on December 10th. Yes thank you. My appt was for later in the day but this is great.  Thank you

## 2020-12-01 NOTE — TELEPHONE ENCOUNTER
Future Appointments   Date Time Provider Johny Tapia   12/10/2020  9:45 AM Dee Moses MD [address] [place] [address]     Patient was placed in the schedule and cancelled afternoon appointment and notified patient.

## 2020-12-07 ENCOUNTER — PATIENT MESSAGE (OUTPATIENT)
Dept: ENDOCRINOLOGY CLINIC | Facility: CLINIC | Age: 55
End: 2020-12-07

## 2020-12-07 RX ORDER — LEVOTHYROXINE SODIUM 0.07 MG/1
TABLET ORAL
Qty: 90 TABLET | Refills: 0 | Status: SHIPPED | OUTPATIENT
Start: 2020-12-07 | End: 2020-12-21

## 2020-12-07 NOTE — TELEPHONE ENCOUNTER
Pt requesting virtual apt on 12/10/20 @ 9:45am (change from in person)- daughter has COVID    Please advise

## 2020-12-07 NOTE — TELEPHONE ENCOUNTER
RN replied to pt with further details about upcoming [facility] apt. [facility]  CPE is scheduled for 10/31/18.  Refill processed.

## 2020-12-07 NOTE — TELEPHONE ENCOUNTER
Yes, please change to doximity visit. Please ask patient to download pump at home for review. Thanks.

## 2020-12-07 NOTE — TELEPHONE ENCOUNTER
From: Jorgito Combs  To: Farooq Marquez MD  Sent: 12/7/2020 12:09 PM CST  Subject: Other    Hi. Just found out my daughter tested positive for covid last night I went this morning to get tested but I have an appt this Thursday with dr Chiara Apple.  Is there a way Junel no longer needed.  If breakthough bleeding continues patient will need to consider changing her birth control from the Depo

## 2020-12-08 ENCOUNTER — PATIENT MESSAGE (OUTPATIENT)
Dept: ENDOCRINOLOGY CLINIC | Facility: CLINIC | Age: 55
End: 2020-12-08

## 2020-12-09 NOTE — TELEPHONE ENCOUNTER
From: Stephanie Combs  To: Rui Garza MD  Sent: 12/8/2020 3:44 PM CST  Subject: Other    Hi. Charlotte Pencil Manjula Pencil Charlotte Pencil I was able to upload my pump info for my Thursday zoom appt. Charlotte Pencil Charlotte Pencil Manjula Pencil I'm sorry but I have another zoom type appt for my cpap that same day at 1 pm and I can't rememb

## 2020-12-10 ENCOUNTER — TELEMEDICINE (OUTPATIENT)
Dept: ENDOCRINOLOGY CLINIC | Facility: CLINIC | Age: 55
End: 2020-12-10
Payer: COMMERCIAL

## 2020-12-10 DIAGNOSIS — E10.65 UNCONTROLLED TYPE 1 DIABETES MELLITUS WITH HYPERGLYCEMIA (HCC): Primary | ICD-10-CM

## 2020-12-10 PROCEDURE — 99214 OFFICE O/P EST MOD 30 MIN: CPT | Performed by: INTERNAL MEDICINE

## 2020-12-10 PROCEDURE — 95251 CONT GLUC MNTR ANALYSIS I&R: CPT | Performed by: INTERNAL MEDICINE

## 2020-12-10 NOTE — PROGRESS NOTES
Telehealth outside of [address] Verbal Consent   I conducted a telehealth visit with Maeve Verduzco today, 12/10/20, which was completed using two-way, real-time interactive audio and video communication.  This has been done in good luke to [employer] or glycohemoglobin were 7.2% 6/2014; 7.9% 9/2014; 7.7% 12/2014; 8.1% 4/2015; 8.4% 6/2016; 9.1% 1/2017; 9.5% 7/2017; 9.3% 2/2018; 9.4% 9/2018; 7.8% 12/2018; 8.4% 9/2019; 8.3% 10/2020 per PCP   Dietary compliance: Good  Exercise: Yes, exercising 4 times pe 3  •  Glucose Blood (CONTOUR NEXT TEST) In Vitro Strip, USE TO CHECK BLOOD SUGAR 8  TIMES DAILY AS DIRECTED, Disp: 800 strip, Rfl: 1  •  Blood Glucose Monitoring Suppl (CONTOUR NEXT MONITOR) w/Device Does not apply Kit, 1 each by Does not apply route daily Types: 2 Glasses of wine per week        Comment: once/month      Drug use: No    Other Topics      Concerns:        Caffeine Concern: Yes          6cups coffee      Medical History:   Past Medical History:   Diagnosis Date   • Atrophic pancreas    • Diabe from PCP     Continuous Glucose Monitoring Interpretation    Renny Wild has undergone continuous glucose monitoring with the personal [employer] continuous glucose monitor. The blood glucose tracings were evaluated for two weeks prior to office visit.

## 2020-12-21 ENCOUNTER — TELEPHONE (OUTPATIENT)
Dept: ENDOCRINOLOGY CLINIC | Facility: CLINIC | Age: 55
End: 2020-12-21

## 2020-12-21 RX ORDER — LEVOTHYROXINE SODIUM 0.07 MG/1
TABLET ORAL
Qty: 90 TABLET | Refills: 3 | Status: SHIPPED | OUTPATIENT
Start: 2020-12-21 | End: 2021-12-30

## 2020-12-24 DIAGNOSIS — E10.65 UNCONTROLLED TYPE 1 DIABETES MELLITUS WITH HYPERGLYCEMIA (HCC): Primary | ICD-10-CM

## 2020-12-28 RX ORDER — INSULIN LISPRO 100 [IU]/ML
INJECTION, SOLUTION INTRAVENOUS; SUBCUTANEOUS
Qty: 120 ML | Refills: 1 | Status: SHIPPED | OUTPATIENT
Start: 2020-12-28 | End: 2021-11-10

## 2020-12-29 RX ORDER — BLOOD SUGAR DIAGNOSTIC
STRIP MISCELLANEOUS
Qty: 400 STRIP | Refills: 13 | Status: SHIPPED | OUTPATIENT
Start: 2020-12-29

## 2020-12-31 ENCOUNTER — TELEPHONE (OUTPATIENT)
Dept: ENDOCRINOLOGY CLINIC | Facility: CLINIC | Age: 55
End: 2020-12-31

## 2021-01-01 ENCOUNTER — EXTERNAL RECORD (OUTPATIENT)
Dept: OTHER | Age: 56
End: 2021-01-01

## 2021-04-13 DIAGNOSIS — G47.33 OBSTRUCTIVE SLEEP APNEA (ADULT) (PEDIATRIC): Primary | ICD-10-CM

## 2021-04-14 ENCOUNTER — OFFICE VISIT (OUTPATIENT)
Dept: ENDOCRINOLOGY CLINIC | Facility: CLINIC | Age: 56
End: 2021-04-14
Payer: COMMERCIAL

## 2021-04-14 VITALS
SYSTOLIC BLOOD PRESSURE: 117 MMHG | BODY MASS INDEX: 32 KG/M2 | WEIGHT: 206 LBS | DIASTOLIC BLOOD PRESSURE: 78 MMHG | HEART RATE: 98 BPM

## 2021-04-14 DIAGNOSIS — E10.65 UNCONTROLLED TYPE 1 DIABETES MELLITUS WITH HYPERGLYCEMIA (HCC): Primary | ICD-10-CM

## 2021-04-14 PROCEDURE — 36416 COLLJ CAPILLARY BLOOD SPEC: CPT | Performed by: INTERNAL MEDICINE

## 2021-04-14 PROCEDURE — 95251 CONT GLUC MNTR ANALYSIS I&R: CPT | Performed by: INTERNAL MEDICINE

## 2021-04-14 PROCEDURE — 83036 HEMOGLOBIN GLYCOSYLATED A1C: CPT | Performed by: INTERNAL MEDICINE

## 2021-04-14 PROCEDURE — 3074F SYST BP LT 130 MM HG: CPT | Performed by: INTERNAL MEDICINE

## 2021-04-14 PROCEDURE — 99214 OFFICE O/P EST MOD 30 MIN: CPT | Performed by: INTERNAL MEDICINE

## 2021-04-14 PROCEDURE — 3078F DIAST BP <80 MM HG: CPT | Performed by: INTERNAL MEDICINE

## 2021-04-14 RX ORDER — BLOOD SUGAR DIAGNOSTIC
STRIP MISCELLANEOUS
Qty: 300 STRIP | Refills: 1 | Status: SHIPPED | OUTPATIENT
Start: 2021-04-14

## 2021-04-14 NOTE — PROGRESS NOTES
Name: Micah Mcintyre  Date: 4/14/2021    Referring Physician: No ref. provider found    HISTORY OF PRESENT ILLNESS   Micah Mcintyre is a 60year old male who presents for diabetes mellitus.   He has history of chronic pancreatitis s/p multiple ERCP and causey BREAKFAST, Disp: 90 tablet, Rfl: 3  •  LISINOPRIL 40 MG Oral Tab, TAKE 1 TABLET BY MOUTH  DAILY, Disp: 30 tablet, Rfl: 11  •  ATORVASTATIN 40 MG Oral Tab, TAKE 1 TABLET BY MOUTH AT  NIGHT, Disp: 30 tablet, Rfl: 11  •  Microlet Lancets Does not apply Misc, Socioeconomic History      Marital status:       Spouse name: Not on file      Number of children: Not on file      Years of education: Not on file      Highest education level: Not on file    Tobacco Use      Smoking status: Former Smoker        Everardo Mcnally importance of SBGM  -Discussed importance of low CHO diet  -Adjusted insulin pump as noted above given persistent post prandial hyperglycemia   -Discussed at length the importance of regular bolus and counting CHO   -Normotensive  -labs improved, continue

## 2021-07-16 ENCOUNTER — TELEPHONE (OUTPATIENT)
Dept: GASTROENTEROLOGY | Age: 56
End: 2021-07-16

## 2021-07-28 ENCOUNTER — OFFICE VISIT (OUTPATIENT)
Dept: ENDOCRINOLOGY CLINIC | Facility: CLINIC | Age: 56
End: 2021-07-28
Payer: COMMERCIAL

## 2021-07-28 VITALS
HEART RATE: 93 BPM | SYSTOLIC BLOOD PRESSURE: 117 MMHG | DIASTOLIC BLOOD PRESSURE: 78 MMHG | BODY MASS INDEX: 31 KG/M2 | WEIGHT: 200 LBS

## 2021-07-28 DIAGNOSIS — E10.65 UNCONTROLLED TYPE 1 DIABETES MELLITUS WITH HYPERGLYCEMIA (HCC): Primary | ICD-10-CM

## 2021-07-28 LAB
CARTRIDGE LOT#: ABNORMAL NUMERIC
GLUCOSE BLOOD: 187
HEMOGLOBIN A1C: 9.3 % (ref 4.3–5.6)
TEST STRIP LOT #: NORMAL NUMERIC

## 2021-07-28 PROCEDURE — 95251 CONT GLUC MNTR ANALYSIS I&R: CPT | Performed by: INTERNAL MEDICINE

## 2021-07-28 PROCEDURE — 36416 COLLJ CAPILLARY BLOOD SPEC: CPT | Performed by: INTERNAL MEDICINE

## 2021-07-28 PROCEDURE — 99214 OFFICE O/P EST MOD 30 MIN: CPT | Performed by: INTERNAL MEDICINE

## 2021-07-28 PROCEDURE — 3074F SYST BP LT 130 MM HG: CPT | Performed by: INTERNAL MEDICINE

## 2021-07-28 PROCEDURE — 3046F HEMOGLOBIN A1C LEVEL >9.0%: CPT | Performed by: INTERNAL MEDICINE

## 2021-07-28 PROCEDURE — 3078F DIAST BP <80 MM HG: CPT | Performed by: INTERNAL MEDICINE

## 2021-07-28 PROCEDURE — 83036 HEMOGLOBIN GLYCOSYLATED A1C: CPT | Performed by: INTERNAL MEDICINE

## 2021-07-28 NOTE — PROGRESS NOTES
Name: Bret Cancino  Date: 7/28/2021    Referring Physician: No ref. provider found    HISTORY OF PRESENT ILLNESS   Bret Cancino is a 60year old male who presents for diabetes mellitus.   He has history of chronic pancreatitis s/p multiple ERCP and causey BEFORE BREAKFAST, Disp: 90 tablet, Rfl: 3  •  LISINOPRIL 40 MG Oral Tab, TAKE 1 TABLET BY MOUTH  DAILY, Disp: 30 tablet, Rfl: 11  •  ATORVASTATIN 40 MG Oral Tab, TAKE 1 TABLET BY MOUTH AT  NIGHT, Disp: 30 tablet, Rfl: 11  •  Microlet Lancets Does not apply History    Socioeconomic History      Marital status:       Spouse name: Not on file      Number of children: Not on file      Years of education: Not on file      Highest education level: Not on file    Tobacco Use      Smoking status: Former Smoke SBGM  -Discussed importance of low CHO diet  -Reviewed Pump download at length and he is largely underestimating the carbohydrate  -He continues to have post prandial hyperglycemia but would not recommend adjusting I:CR  -Discussed importance of increasing

## 2021-08-04 DIAGNOSIS — Z86.010 PERSONAL HISTORY OF COLONIC POLYPS: Primary | ICD-10-CM

## 2021-08-04 RX ORDER — SODIUM CHLORIDE, SODIUM LACTATE, POTASSIUM CHLORIDE, CALCIUM CHLORIDE 600; 310; 30; 20 MG/100ML; MG/100ML; MG/100ML; MG/100ML
INJECTION, SOLUTION INTRAVENOUS CONTINUOUS
Status: CANCELLED | OUTPATIENT
Start: 2021-08-04

## 2021-08-04 RX ORDER — SODIUM, POTASSIUM,MAG SULFATES 17.5-3.13G
SOLUTION, RECONSTITUTED, ORAL ORAL
Qty: 1 KIT | Refills: 0 | Status: SHIPPED | OUTPATIENT
Start: 2021-08-04 | End: 2021-10-07 | Stop reason: ALTCHOICE

## 2021-08-04 RX ORDER — 0.9 % SODIUM CHLORIDE 0.9 %
2 VIAL (ML) INJECTION EVERY 12 HOURS SCHEDULED
Status: CANCELLED | OUTPATIENT
Start: 2021-08-04

## 2021-09-15 ENCOUNTER — TELEPHONE (OUTPATIENT)
Dept: GASTROENTEROLOGY | Age: 56
End: 2021-09-15

## 2021-09-22 ENCOUNTER — HOSPITAL ENCOUNTER (OUTPATIENT)
Dept: ENDOCRINOLOGY | Facility: HOSPITAL | Age: 56
Discharge: HOME | End: 2021-09-22
Attending: INTERNAL MEDICINE
Payer: COMMERCIAL

## 2021-09-22 DIAGNOSIS — E10.65 UNCONTROLLED TYPE 1 DIABETES MELLITUS WITH HYPERGLYCEMIA, WITH LONG-TERM CURRENT USE OF INSULIN (HCC): Primary | ICD-10-CM

## 2021-09-22 NOTE — PATIENT INSTRUCTIONS
Goals     1. EDC - Insulin Pump (pt-stated)       Note created  9/22/2021  9:04 AM by Dagoberto Galvan RN      1. Enter carbohydrates into pump for meals and snacks on the weekend.       2.  EDC - Insulin Pump (pt-stated)       Note created  9/22/2021  9:04

## 2021-09-22 NOTE — PROGRESS NOTES
Galo Ricardo  : 12/10/1965 was seen for Individual Insulin Pump Follow up:Medtronic 770G    Date: 2021   Start time: 8:05 am End time: 9:05 am    Shawnaabdullahi Gonsaless attended appointment with his spouse, Maliha Pereyra.  He has a history of education at the Diabetes C acknowledges he needs to work on several pump behaviors to assist with glucose management and is agreeable to enter carbohydrate counts into his pump on the weekends when family is available to support him as needed.     Reviewed pump settings:   Basal rate

## 2021-10-05 LAB — SARS-COV-2 RNA SPEC QL NAA+PROBE: NOT DETECTED

## 2021-10-07 ENCOUNTER — ANESTHESIA EVENT (OUTPATIENT)
Dept: GASTROENTEROLOGY | Age: 56
End: 2021-10-07

## 2021-10-07 ENCOUNTER — ANESTHESIA (OUTPATIENT)
Dept: GASTROENTEROLOGY | Age: 56
End: 2021-10-07

## 2021-10-07 ENCOUNTER — HOSPITAL ENCOUNTER (OUTPATIENT)
Dept: GASTROENTEROLOGY | Age: 56
Discharge: HOME | End: 2021-10-07
Attending: INTERNAL MEDICINE

## 2021-10-07 VITALS
HEART RATE: 84 BPM | WEIGHT: 200 LBS | TEMPERATURE: 97.3 F | OXYGEN SATURATION: 96 % | SYSTOLIC BLOOD PRESSURE: 118 MMHG | RESPIRATION RATE: 15 BRPM | BODY MASS INDEX: 31.39 KG/M2 | HEIGHT: 67 IN | DIASTOLIC BLOOD PRESSURE: 77 MMHG

## 2021-10-07 DIAGNOSIS — Z86.010 PERSONAL HISTORY OF COLONIC POLYPS: ICD-10-CM

## 2021-10-07 DIAGNOSIS — K57.30 DIVERTICULOSIS OF LARGE INTESTINE WITHOUT PERFORATION OR ABSCESS WITHOUT BLEEDING: ICD-10-CM

## 2021-10-07 LAB — GLUCOSE BLDC GLUCOMTR-MCNC: 95 MG/DL (ref 70–99)

## 2021-10-07 PROCEDURE — 13000004 HB  ANESTHESIA  GENERAL OUTSIDE OR

## 2021-10-07 PROCEDURE — 10002807 HB RX 258

## 2021-10-07 PROCEDURE — 13000001 HB PHASE II RECOVERY EA 30 MINUTES

## 2021-10-07 PROCEDURE — 13000024 HB GI COMPLEX CASE S/U + 1ST 15 MIN

## 2021-10-07 PROCEDURE — 10002801 HB RX 250 W/O HCPCS: Performed by: ANESTHESIOLOGY

## 2021-10-07 PROCEDURE — 82962 GLUCOSE BLOOD TEST: CPT

## 2021-10-07 PROCEDURE — 45378 DIAGNOSTIC COLONOSCOPY: CPT | Performed by: INTERNAL MEDICINE

## 2021-10-07 PROCEDURE — 10002800 HB RX 250 W HCPCS: Performed by: ANESTHESIOLOGY

## 2021-10-07 PROCEDURE — 13000025 HB GI COMPLEX CASE EACH ADD MINUTE

## 2021-10-07 PROCEDURE — 10002803 HB RX 637: Performed by: INTERNAL MEDICINE

## 2021-10-07 RX ORDER — ONDANSETRON 2 MG/ML
4 INJECTION INTRAMUSCULAR; INTRAVENOUS EVERY 12 HOURS PRN
Status: DISCONTINUED | OUTPATIENT
Start: 2021-10-07 | End: 2021-10-09 | Stop reason: HOSPADM

## 2021-10-07 RX ORDER — SODIUM CHLORIDE, SODIUM LACTATE, POTASSIUM CHLORIDE, CALCIUM CHLORIDE 600; 310; 30; 20 MG/100ML; MG/100ML; MG/100ML; MG/100ML
INJECTION, SOLUTION INTRAVENOUS CONTINUOUS
Status: DISCONTINUED | OUTPATIENT
Start: 2021-10-07 | End: 2021-10-09 | Stop reason: HOSPADM

## 2021-10-07 RX ORDER — SODIUM CHLORIDE, SODIUM LACTATE, POTASSIUM CHLORIDE, CALCIUM CHLORIDE 600; 310; 30; 20 MG/100ML; MG/100ML; MG/100ML; MG/100ML
INJECTION, SOLUTION INTRAVENOUS
Status: COMPLETED
Start: 2021-10-07 | End: 2021-10-07

## 2021-10-07 RX ORDER — LIDOCAINE HYDROCHLORIDE 10 MG/ML
INJECTION, SOLUTION INFILTRATION; PERINEURAL PRN
Status: DISCONTINUED | OUTPATIENT
Start: 2021-10-07 | End: 2021-10-07

## 2021-10-07 RX ORDER — 0.9 % SODIUM CHLORIDE 0.9 %
2 VIAL (ML) INJECTION EVERY 12 HOURS SCHEDULED
Status: DISCONTINUED | OUTPATIENT
Start: 2021-10-07 | End: 2021-10-09 | Stop reason: HOSPADM

## 2021-10-07 RX ORDER — PROPOFOL 10 MG/ML
INJECTION, EMULSION INTRAVENOUS PRN
Status: DISCONTINUED | OUTPATIENT
Start: 2021-10-07 | End: 2021-10-07

## 2021-10-07 RX ORDER — SIMETHICONE 20 MG/.3ML
EMULSION ORAL PRN
Status: COMPLETED | OUTPATIENT
Start: 2021-10-07 | End: 2021-10-07

## 2021-10-07 RX ADMIN — SIMETHICONE 40 MG: 63.3; 3.7 SOLUTION/ DROPS ORAL at 08:51

## 2021-10-07 RX ADMIN — LIDOCAINE HYDROCHLORIDE 50 MG: 10 INJECTION, SOLUTION INFILTRATION; PERINEURAL at 08:39

## 2021-10-07 RX ADMIN — PROPOFOL 150 MCG/KG/MIN: 10 INJECTION, EMULSION INTRAVENOUS at 08:40

## 2021-10-07 RX ADMIN — PROPOFOL 100 MG: 10 INJECTION, EMULSION INTRAVENOUS at 08:39

## 2021-10-07 RX ADMIN — SODIUM CHLORIDE, SODIUM LACTATE, POTASSIUM CHLORIDE, CALCIUM CHLORIDE: 600; 310; 30; 20 INJECTION, SOLUTION INTRAVENOUS at 08:19

## 2021-10-07 RX ADMIN — SODIUM CHLORIDE, SODIUM LACTATE, POTASSIUM CHLORIDE, AND CALCIUM CHLORIDE: .6; .31; .03; .02 INJECTION, SOLUTION INTRAVENOUS at 08:19

## 2021-10-07 ASSESSMENT — PAIN SCALES - GENERAL
PAINLEVEL_OUTOF10: 0
PAINLEVEL_OUTOF10: 0

## 2021-10-07 ASSESSMENT — ACTIVITIES OF DAILY LIVING (ADL)
ADL_SCORE: 12
ADL_BEFORE_ADMISSION: INDEPENDENT
HISTORY OF FALLING IN THE LAST YEAR (PRIOR TO ADMISSION): NO

## 2021-10-11 NOTE — TELEPHONE ENCOUNTER
Medication PA Requested:                                                          CoverMyMeds Used: CONTOUR NEXT TEST In Vitro Strip  Key:  Quantity: 400 strips, 13 refills  Day Supply: n/a  Sig: USE TO CHECK BLOOD SUGAR UP TO 8 TIMES PER DAY  DX Code: E10

## 2021-10-13 ENCOUNTER — HOSPITAL ENCOUNTER (OUTPATIENT)
Dept: ENDOCRINOLOGY | Facility: HOSPITAL | Age: 56
Discharge: HOME | End: 2021-10-13
Attending: INTERNAL MEDICINE
Payer: COMMERCIAL

## 2021-10-13 DIAGNOSIS — E10.65 UNCONTROLLED TYPE 1 DIABETES MELLITUS WITH HYPERGLYCEMIA, WITH LONG-TERM CURRENT USE OF INSULIN (HCC): Primary | ICD-10-CM

## 2021-10-13 NOTE — PATIENT INSTRUCTIONS
Goals                    Today    1. EDC - Insulin Pump (pt-stated)   Doing What I Said      Note created  9/22/2021  9:04 AM by Scott Sidhu RN      1. Enter carbohydrates into pump for meals and snacks on the weekend.       2.  EDC - Insulin Pump (pt-s

## 2021-10-13 NOTE — PROGRESS NOTES
Stephanie Combs  : 12/10/1965 was seen for Individual Insulin Pump Follow up:Medtronic 770 G    Date: 10/13/2021   Start time: 8:00 am End time: 8:30 am    Freda Jesus provided food records for appointment.  He has been using a phone wilfredo to assist with carbohy (improved)  Standard Deviation: +/- 70  Average Sensor Glucose (mg/dL): 165  Standard Deviation: +/- 62     63*% Time in range improved  1*% Time below range Improved  36*% Time above range (10% > 250 mg/dl) improved    TDD: 104.3  %TDD Basal: 40.6  %TDD B

## 2021-11-10 ENCOUNTER — HOSPITAL ENCOUNTER (OUTPATIENT)
Dept: ENDOCRINOLOGY | Facility: HOSPITAL | Age: 56
Discharge: HOME | End: 2021-11-10
Attending: INTERNAL MEDICINE
Payer: COMMERCIAL

## 2021-11-10 VITALS — BODY MASS INDEX: 33 KG/M2 | WEIGHT: 208.38 LBS

## 2021-11-10 DIAGNOSIS — E10.65 UNCONTROLLED TYPE 1 DIABETES MELLITUS WITH HYPERGLYCEMIA (HCC): ICD-10-CM

## 2021-11-10 DIAGNOSIS — E10.65 UNCONTROLLED TYPE 1 DIABETES MELLITUS WITH HYPERGLYCEMIA, WITH LONG-TERM CURRENT USE OF INSULIN (HCC): Primary | ICD-10-CM

## 2021-11-10 RX ORDER — INSULIN LISPRO 100 [IU]/ML
INJECTION, SOLUTION INTRAVENOUS; SUBCUTANEOUS
Qty: 120 ML | Refills: 1 | Status: SHIPPED | OUTPATIENT
Start: 2021-11-10

## 2021-11-10 NOTE — PROGRESS NOTES
Maeve Verduzco  : 12/10/1965 was seen for Individual Insulin Pump Follow up: [facility] 020 G    Date: 11/10/2021   Start time: 7:45 am End time: 8:15 am    Mari Cortés feels he is utilizing his pump to achieve better outcomes for his Diabetes management.  He TBD    Contact the Center if experience low or high blood sugar patterns to schedule appointment. Patient verbalized understanding and has no further questions at this time.     Pawel Caro RN

## 2021-11-10 NOTE — PATIENT INSTRUCTIONS
1. Contact  [employer] to discuss Sensor issues  2. Replace Sensor after 2 hours charge for increase Auto Mode use  3. Schedule appointment with Dr. Katy Newell                    Today    10/13/2021    1.   EDC - Insulin Pump (pt-stated)   Doing What I S

## 2021-11-10 NOTE — TELEPHONE ENCOUNTER
LOV 07/28/21. RTC 3 months. Patient followed up at the center for diabetes 11/10/21. Pended 6 month supply.

## 2021-11-15 ENCOUNTER — TELEPHONE (OUTPATIENT)
Dept: ENDOCRINOLOGY CLINIC | Facility: CLINIC | Age: 56
End: 2021-11-15

## 2021-11-15 NOTE — TELEPHONE ENCOUNTER
Received fax from [employer]. They are requesting we fax back chart notes/labs and log books prior to 09/09/21. Completed and faxed back requested documents, used log book from [facility] download.

## 2021-12-03 ENCOUNTER — TELEPHONE (OUTPATIENT)
Dept: ENDOCRINOLOGY CLINIC | Facility: CLINIC | Age: 56
End: 2021-12-03

## 2021-12-03 NOTE — TELEPHONE ENCOUNTER
Received fax from [employer] Completed the attached prescription order form. Placed on MD desk for signature.

## 2021-12-21 ENCOUNTER — TELEPHONE (OUTPATIENT)
Dept: ENDOCRINOLOGY CLINIC | Facility: CLINIC | Age: 56
End: 2021-12-21

## 2021-12-21 NOTE — TELEPHONE ENCOUNTER
Form received from [employer], on provider desk for signature.   Responded to pt via [employer] form just received, we will fax once signed

## 2021-12-21 NOTE — TELEPHONE ENCOUNTER
Pt. Calling to find out if Dr David Jang has signed off on [employer] form, so that the pt. Can get a new insulin pump, as his current pump has a crack on the side of it.

## 2021-12-22 NOTE — TELEPHONE ENCOUNTER
Signed form faxed to [employer] at [phone]. [facility]

## 2021-12-28 ENCOUNTER — OFFICE VISIT (OUTPATIENT)
Dept: GASTROENTEROLOGY | Age: 56
End: 2021-12-28

## 2021-12-28 ENCOUNTER — TELEPHONE (OUTPATIENT)
Dept: ENDOCRINOLOGY CLINIC | Facility: CLINIC | Age: 56
End: 2021-12-28

## 2021-12-28 ENCOUNTER — TELEPHONE (OUTPATIENT)
Dept: GASTROENTEROLOGY | Age: 56
End: 2021-12-28

## 2021-12-28 VITALS
WEIGHT: 208 LBS | SYSTOLIC BLOOD PRESSURE: 109 MMHG | HEIGHT: 67 IN | HEART RATE: 104 BPM | BODY MASS INDEX: 32.65 KG/M2 | DIASTOLIC BLOOD PRESSURE: 63 MMHG

## 2021-12-28 DIAGNOSIS — K86.1 IDIOPATHIC CHRONIC PANCREATITIS (CMD): Primary | ICD-10-CM

## 2021-12-28 DIAGNOSIS — K86.2 PANCREATIC CYST: ICD-10-CM

## 2021-12-28 DIAGNOSIS — K86.89 PANCREATIC INSUFFICIENCY: ICD-10-CM

## 2021-12-28 PROCEDURE — 99244 OFF/OP CNSLTJ NEW/EST MOD 40: CPT | Performed by: INTERNAL MEDICINE

## 2021-12-28 ASSESSMENT — ENCOUNTER SYMPTOMS
EYE PAIN: 0
WEIGHT LOSS: 0
VOMITING: 1
FEVER: 0
SHORTNESS OF BREATH: 0
CHILLS: 0
HEADACHES: 0
SORE THROAT: 0
DIZZINESS: 0
DIARRHEA: 0
HEARTBURN: 0
BACK PAIN: 0
MEMORY LOSS: 0
CONSTIPATION: 0
HALLUCINATIONS: 0
NAUSEA: 1
ABDOMINAL PAIN: 1
COUGH: 0
WEAKNESS: 0

## 2021-12-28 NOTE — TELEPHONE ENCOUNTER
Received fax from [facility]. They are requesting we fax back recent chart notes so they may complete their review for the insulin pump. Faxed back chart notes from 07/28/21.

## 2021-12-29 ENCOUNTER — E-ADVICE (OUTPATIENT)
Dept: GASTROENTEROLOGY | Age: 56
End: 2021-12-29

## 2021-12-29 ENCOUNTER — TELEPHONE (OUTPATIENT)
Dept: GASTROENTEROLOGY | Age: 56
End: 2021-12-29

## 2021-12-29 DIAGNOSIS — K86.1 IDIOPATHIC CHRONIC PANCREATITIS (CMD): ICD-10-CM

## 2021-12-29 DIAGNOSIS — K86.89 PANCREATIC INSUFFICIENCY: ICD-10-CM

## 2021-12-29 DIAGNOSIS — K86.2 PANCREATIC CYST: ICD-10-CM

## 2021-12-30 ENCOUNTER — APPOINTMENT (OUTPATIENT)
Dept: MRI IMAGING | Age: 56
End: 2021-12-30
Attending: INTERNAL MEDICINE

## 2021-12-30 RX ORDER — LEVOTHYROXINE SODIUM 0.07 MG/1
TABLET ORAL
Qty: 90 TABLET | Refills: 3 | Status: SHIPPED | OUTPATIENT
Start: 2021-12-30

## 2021-12-30 NOTE — TELEPHONE ENCOUNTER
Last refill 12/21/20 for one year supply    One year supply pending again.       Last office visit: 7/24/21     Future visit scheduled for 1/20/22    Last TSH: 2.45 (4/25/21)

## 2022-01-05 ENCOUNTER — OFFICE VISIT (OUTPATIENT)
Dept: ENDOCRINOLOGY CLINIC | Facility: CLINIC | Age: 57
End: 2022-01-05
Payer: COMMERCIAL

## 2022-01-05 VITALS
HEART RATE: 98 BPM | DIASTOLIC BLOOD PRESSURE: 72 MMHG | BODY MASS INDEX: 33 KG/M2 | SYSTOLIC BLOOD PRESSURE: 115 MMHG | WEIGHT: 207.81 LBS

## 2022-01-05 DIAGNOSIS — E10.65 UNCONTROLLED TYPE 1 DIABETES MELLITUS WITH HYPERGLYCEMIA (HCC): Primary | ICD-10-CM

## 2022-01-05 LAB
CARTRIDGE LOT#: ABNORMAL NUMERIC
GLUCOSE BLOOD: 233
HEMOGLOBIN A1C: 7.3 % (ref 4.3–5.6)
TEST STRIP LOT #: NORMAL NUMERIC

## 2022-01-05 PROCEDURE — 3051F HG A1C>EQUAL 7.0%<8.0%: CPT | Performed by: INTERNAL MEDICINE

## 2022-01-05 PROCEDURE — 95251 CONT GLUC MNTR ANALYSIS I&R: CPT | Performed by: INTERNAL MEDICINE

## 2022-01-05 PROCEDURE — 83036 HEMOGLOBIN GLYCOSYLATED A1C: CPT | Performed by: INTERNAL MEDICINE

## 2022-01-05 PROCEDURE — 99213 OFFICE O/P EST LOW 20 MIN: CPT | Performed by: INTERNAL MEDICINE

## 2022-01-05 PROCEDURE — 36416 COLLJ CAPILLARY BLOOD SPEC: CPT | Performed by: INTERNAL MEDICINE

## 2022-01-05 PROCEDURE — 3078F DIAST BP <80 MM HG: CPT | Performed by: INTERNAL MEDICINE

## 2022-01-05 PROCEDURE — 3074F SYST BP LT 130 MM HG: CPT | Performed by: INTERNAL MEDICINE

## 2022-01-05 RX ORDER — PANCRELIPASE 24000; 76000; 120000 [USP'U]/1; [USP'U]/1; [USP'U]/1
3 CAPSULE, DELAYED RELEASE PELLETS ORAL
COMMUNITY

## 2022-01-05 NOTE — PROGRESS NOTES
Name: Sheryle Frankel  Date: 1/5/2022    Referring Physician: No ref. provider found    HISTORY OF PRESENT ILLNESS   Sheryle Frankel is a 60year old male who presents for diabetes mellitus.   He has history of chronic pancreatitis s/p multiple ERCP and tara PER DAY, Disp: 400 strip, Rfl: 13  •  LISINOPRIL 40 MG Oral Tab, TAKE 1 TABLET BY MOUTH  DAILY, Disp: 30 tablet, Rfl: 11  •  ATORVASTATIN 40 MG Oral Tab, TAKE 1 TABLET BY MOUTH AT  NIGHT, Disp: 30 tablet, Rfl: 11  •  Microlet Lancets Does not apply Misc, U Socioeconomic History      Marital status:     Tobacco Use      Smoking status: Former Smoker        Quit date: 2002        Years since quittin.4      Smokeless tobacco: Never Used    Substance and Sexual Activity      Alcohol use:  Yes current pump settings   -Normotensive  -labs improved, continue statin  -Normal renal function  -UTD with optho  -Labs UTD with PCP  -He will try to update to 780G pump in March to help increase time in AutoMode      2.  Hypothyroidism  -New diagnosis  -Con

## 2022-01-11 ENCOUNTER — E-ADVICE (OUTPATIENT)
Dept: GASTROENTEROLOGY | Age: 57
End: 2022-01-11

## 2022-01-11 ENCOUNTER — HOSPITAL ENCOUNTER (OUTPATIENT)
Dept: MRI IMAGING | Age: 57
Discharge: HOME | End: 2022-01-11
Attending: INTERNAL MEDICINE

## 2022-01-11 ENCOUNTER — TELEPHONE (OUTPATIENT)
Dept: GASTROENTEROLOGY | Age: 57
End: 2022-01-11

## 2022-01-11 DIAGNOSIS — K86.1 IDIOPATHIC CHRONIC PANCREATITIS (CMD): ICD-10-CM

## 2022-01-11 DIAGNOSIS — K86.2 PANCREATIC CYST: ICD-10-CM

## 2022-01-11 DIAGNOSIS — K86.89 PANCREATIC INSUFFICIENCY: Primary | ICD-10-CM

## 2022-01-11 LAB
CREAT SERPL-MCNC: 0.9 MG/DL (ref 0.67–1.17)
GFR SERPLBLD BASED ON 1.73 SQ M-ARVRAT: >90 ML/MIN

## 2022-01-11 PROCEDURE — A9585 GADOBUTROL INJECTION: HCPCS | Performed by: INTERNAL MEDICINE

## 2022-01-11 PROCEDURE — 74183 MRI ABD W/O CNTR FLWD CNTR: CPT

## 2022-01-11 PROCEDURE — 10002805 HB CONTRAST AGENT: Performed by: INTERNAL MEDICINE

## 2022-01-11 PROCEDURE — 82565 ASSAY OF CREATININE: CPT

## 2022-01-11 PROCEDURE — G1004 CDSM NDSC: HCPCS

## 2022-01-11 RX ORDER — GADOBUTROL 604.72 MG/ML
10 INJECTION INTRAVENOUS ONCE
Status: COMPLETED | OUTPATIENT
Start: 2022-01-11 | End: 2022-01-11

## 2022-01-11 RX ADMIN — GADOBUTROL 10 ML: 604.72 INJECTION INTRAVENOUS at 14:50

## 2022-02-11 ENCOUNTER — TELEPHONE (OUTPATIENT)
Dept: GASTROENTEROLOGY | Age: 57
End: 2022-02-11

## 2022-03-15 ENCOUNTER — PATIENT MESSAGE (OUTPATIENT)
Dept: ENDOCRINOLOGY CLINIC | Facility: CLINIC | Age: 57
End: 2022-03-15

## 2022-03-17 ENCOUNTER — TELEPHONE (OUTPATIENT)
Dept: ENDOCRINOLOGY CLINIC | Facility: CLINIC | Age: 57
End: 2022-03-17

## 2022-03-17 NOTE — TELEPHONE ENCOUNTER
Received fax from [facility]. Attached is pt most recent lab results. Placed on provider desk for review.

## 2022-03-23 RX ORDER — INSULIN LISPRO 100 [IU]/ML
INJECTION, SOLUTION INTRAVENOUS; SUBCUTANEOUS
Qty: 120 ML | Refills: 1 | Status: SHIPPED | OUTPATIENT
Start: 2022-03-23 | End: 2022-03-29

## 2022-03-23 NOTE — TELEPHONE ENCOUNTER
LOV: 1/5/22 RTC 3 months    No future appointments. Pended insulin lispro for provider to review and approve. Creon would have to be addressed by prescribing doctor (GI). RN sent [employer] message to patient regarding this and to make an appointment with office for the 3 month follow up.

## 2022-03-29 ENCOUNTER — TELEPHONE (OUTPATIENT)
Dept: ENDOCRINOLOGY CLINIC | Facility: CLINIC | Age: 57
End: 2022-03-29

## 2022-03-29 RX ORDER — INSULIN LISPRO 100 [IU]/ML
INJECTION, SOLUTION INTRAVENOUS; SUBCUTANEOUS
Qty: 120 ML | Refills: 0 | Status: SHIPPED | OUTPATIENT
Start: 2022-03-29 | End: 2022-03-29

## 2022-03-29 NOTE — TELEPHONE ENCOUNTER
LOV: 1/5/22  RTC: 3 months    RX for humalog resent to [facility] (was sent to [facility] on 3/23/22)    LM and sent [facility] advising patient that Creon is from another provider and advised patient he is due for f/u

## 2022-03-31 ENCOUNTER — PATIENT MESSAGE (OUTPATIENT)
Dept: ENDOCRINOLOGY CLINIC | Facility: CLINIC | Age: 57
End: 2022-03-31

## 2022-03-31 RX ORDER — PANCRELIPASE 24000; 76000; 120000 [USP'U]/1; [USP'U]/1; [USP'U]/1
3 CAPSULE, DELAYED RELEASE PELLETS ORAL
Qty: 1080 CAPSULE | Refills: 1 | Status: SHIPPED | OUTPATIENT
Start: 2022-03-31

## 2022-03-31 NOTE — TELEPHONE ENCOUNTER
From: Marquez Ellison  To: Chris Moraes MD  Sent: 3/15/2022 9:57 AM CDT  Subject: My creon rx     Morning. I have new health insurance that started this year which means my on line pharmacy that fills my prescriptions will send you a request for the new rxs to be refilled. Here is a picture of my rx for creon. Please note that the 90 day rx is for a total of 1,080 pills as that is the correct number for 90 days as I have to take these pills every time I eat to help digest my food ,,,,,,please note this rx is from [facility] but I won't be using them. I called my online pharmacy this morning and have them your information so they may be faxing you today on this and for my humalog insulin for my pump.  Thanks michelle

## 2022-04-01 ENCOUNTER — TELEPHONE (OUTPATIENT)
Dept: ENDOCRINOLOGY CLINIC | Facility: CLINIC | Age: 57
End: 2022-04-01

## 2022-06-25 DIAGNOSIS — K86.2 PANCREATIC CYST: ICD-10-CM

## 2022-06-25 DIAGNOSIS — K86.89 PANCREATIC INSUFFICIENCY: ICD-10-CM

## 2022-06-25 DIAGNOSIS — K86.1 IDIOPATHIC CHRONIC PANCREATITIS (CMD): ICD-10-CM

## 2022-06-27 RX ORDER — PANCRELIPASE 24000; 76000; 120000 [USP'U]/1; [USP'U]/1; [USP'U]/1
CAPSULE, DELAYED RELEASE PELLETS ORAL
Qty: 360 CAPSULE | Refills: 3 | Status: SHIPPED | OUTPATIENT
Start: 2022-06-27 | End: 2022-11-14 | Stop reason: SDUPTHER

## 2022-08-19 ENCOUNTER — OFFICE VISIT (OUTPATIENT)
Dept: ENDOCRINOLOGY CLINIC | Facility: CLINIC | Age: 57
End: 2022-08-19
Payer: COMMERCIAL

## 2022-08-19 VITALS — BODY MASS INDEX: 32 KG/M2 | WEIGHT: 207 LBS

## 2022-08-19 DIAGNOSIS — E03.8 HYPOTHYROIDISM DUE TO HASHIMOTO'S THYROIDITIS: ICD-10-CM

## 2022-08-19 DIAGNOSIS — E06.3 HYPOTHYROIDISM DUE TO HASHIMOTO'S THYROIDITIS: ICD-10-CM

## 2022-08-19 DIAGNOSIS — E10.65 UNCONTROLLED TYPE 1 DIABETES MELLITUS WITH HYPERGLYCEMIA (HCC): Primary | ICD-10-CM

## 2022-08-19 LAB
CARTRIDGE LOT#: ABNORMAL NUMERIC
GLUCOSE BLOOD: 147
HEMOGLOBIN A1C: 8.7 % (ref 4.3–5.6)
TEST STRIP LOT #: NORMAL NUMERIC

## 2022-08-19 PROCEDURE — 95251 CONT GLUC MNTR ANALYSIS I&R: CPT | Performed by: INTERNAL MEDICINE

## 2022-08-19 PROCEDURE — 3052F HG A1C>EQUAL 8.0%<EQUAL 9.0%: CPT | Performed by: INTERNAL MEDICINE

## 2022-08-19 PROCEDURE — 83036 HEMOGLOBIN GLYCOSYLATED A1C: CPT | Performed by: INTERNAL MEDICINE

## 2022-08-19 PROCEDURE — 82947 ASSAY GLUCOSE BLOOD QUANT: CPT | Performed by: INTERNAL MEDICINE

## 2022-08-19 PROCEDURE — 99214 OFFICE O/P EST MOD 30 MIN: CPT | Performed by: INTERNAL MEDICINE

## 2022-11-11 ENCOUNTER — TELEPHONE (OUTPATIENT)
Dept: GASTROENTEROLOGY | Age: 57
End: 2022-11-11

## 2022-11-11 DIAGNOSIS — K86.1 IDIOPATHIC CHRONIC PANCREATITIS (CMD): ICD-10-CM

## 2022-11-11 DIAGNOSIS — K86.2 PANCREATIC CYST: ICD-10-CM

## 2022-11-11 DIAGNOSIS — K86.89 PANCREATIC INSUFFICIENCY: ICD-10-CM

## 2022-11-14 RX ORDER — PANCRELIPASE 24000; 76000; 120000 [USP'U]/1; [USP'U]/1; [USP'U]/1
CAPSULE, DELAYED RELEASE PELLETS ORAL
Qty: 360 CAPSULE | Refills: 3 | Status: SHIPPED | OUTPATIENT
Start: 2022-11-14

## 2022-12-28 ENCOUNTER — PATIENT MESSAGE (OUTPATIENT)
Dept: ENDOCRINOLOGY CLINIC | Facility: CLINIC | Age: 57
End: 2022-12-28

## 2022-12-28 DIAGNOSIS — E10.65 UNCONTROLLED TYPE 1 DIABETES MELLITUS WITH HYPERGLYCEMIA (HCC): ICD-10-CM

## 2022-12-29 RX ORDER — INSULIN ASPART 100 [IU]/ML
INJECTION, SOLUTION INTRAVENOUS; SUBCUTANEOUS
Qty: 120 ML | Refills: 0 | Status: SHIPPED | OUTPATIENT
Start: 2022-12-29

## 2022-12-29 NOTE — TELEPHONE ENCOUNTER
Called and spoke with pharmacy tech. Stated Humalog is not covered by insurance. Spoke with pharmacist who states Novolog is covered. Can only do 110ml which is 88 days. Will have to order it. Responded to pt via [employer].

## 2023-02-03 ENCOUNTER — TELEPHONE (OUTPATIENT)
Dept: ENDOCRINOLOGY CLINIC | Facility: CLINIC | Age: 58
End: 2023-02-03

## 2023-02-03 NOTE — TELEPHONE ENCOUNTER
Received fax from [employer], attached is a prescription for pump supplies. Placed in Dr [employer] folder for signature.

## 2023-02-20 ENCOUNTER — OFFICE VISIT (OUTPATIENT)
Dept: ENDOCRINOLOGY CLINIC | Facility: CLINIC | Age: 58
End: 2023-02-20

## 2023-02-20 ENCOUNTER — PATIENT MESSAGE (OUTPATIENT)
Dept: ENDOCRINOLOGY CLINIC | Facility: CLINIC | Age: 58
End: 2023-02-20

## 2023-02-20 VITALS
HEART RATE: 87 BPM | DIASTOLIC BLOOD PRESSURE: 78 MMHG | BODY MASS INDEX: 35 KG/M2 | WEIGHT: 222 LBS | SYSTOLIC BLOOD PRESSURE: 106 MMHG

## 2023-02-20 DIAGNOSIS — Z79.4 UNCONTROLLED TYPE 2 DIABETES MELLITUS WITH HYPERGLYCEMIA, WITH LONG-TERM CURRENT USE OF INSULIN (HCC): Primary | ICD-10-CM

## 2023-02-20 DIAGNOSIS — E11.65 UNCONTROLLED TYPE 2 DIABETES MELLITUS WITH HYPERGLYCEMIA, WITH LONG-TERM CURRENT USE OF INSULIN (HCC): Primary | ICD-10-CM

## 2023-02-20 LAB
CARTRIDGE LOT#: ABNORMAL NUMERIC
GLUCOSE BLOOD: 181
HEMOGLOBIN A1C: 8.4 % (ref 4.3–5.6)
TEST STRIP LOT #: NORMAL NUMERIC

## 2023-02-20 PROCEDURE — 3052F HG A1C>EQUAL 8.0%<EQUAL 9.0%: CPT | Performed by: NURSE PRACTITIONER

## 2023-02-20 PROCEDURE — 3078F DIAST BP <80 MM HG: CPT | Performed by: NURSE PRACTITIONER

## 2023-02-20 PROCEDURE — 83036 HEMOGLOBIN GLYCOSYLATED A1C: CPT | Performed by: NURSE PRACTITIONER

## 2023-02-20 PROCEDURE — 82947 ASSAY GLUCOSE BLOOD QUANT: CPT | Performed by: NURSE PRACTITIONER

## 2023-02-20 PROCEDURE — 99214 OFFICE O/P EST MOD 30 MIN: CPT | Performed by: NURSE PRACTITIONER

## 2023-02-20 PROCEDURE — 3074F SYST BP LT 130 MM HG: CPT | Performed by: NURSE PRACTITIONER

## 2023-02-20 RX ORDER — EMPAGLIFLOZIN 25 MG/1
1 TABLET, FILM COATED ORAL DAILY
Qty: 90 TABLET | Refills: 0 | Status: SHIPPED | OUTPATIENT
Start: 2023-02-20

## 2023-02-20 NOTE — PATIENT INSTRUCTIONS
A1C: 8.4% today --> decreased from 8.7% on 8/19/2022  Blood glucose: 181 in clinic today    Medications:   Medtronic Pump 770G  Basal:  12A 1.70  10A 1.80  8P 1.60    I:CR  12A 3.0  5P 2.5     Sensitivity  20  Active Insulin Time 2.5 hours   Goal 110-120    - start administering meal bolus 20 mins prior to start of meal   - start Jardiance 25mg once daily in AM (before breakfast)   -Please make sure to drink an additional 1-2 glasses of water per day     A1C goal:  <7.0%    Blood sugar targets:  Before breakfast:   (preferably < 110)  Before meals OR 2 hours after meals: <150    Call for persistent blood sugars < 75 or > 200

## 2023-02-20 NOTE — TELEPHONE ENCOUNTER
From: Shawn Benitez  To: KASSY Cruz  Sent: 2/20/2023 10:45 AM CST  Subject: Thank you- excellent job     Dorian Andrews , I wanted to thank you once again for spending the time with me during my visit today.  You are excellent at what you do for your patients

## 2023-02-23 ENCOUNTER — PATIENT MESSAGE (OUTPATIENT)
Dept: ENDOCRINOLOGY CLINIC | Facility: CLINIC | Age: 58
End: 2023-02-23

## 2023-02-23 RX ORDER — EMPAGLIFLOZIN 25 MG/1
1 TABLET, FILM COATED ORAL DAILY
Qty: 90 TABLET | Refills: 0 | Status: SHIPPED | OUTPATIENT
Start: 2023-02-23

## 2023-02-23 NOTE — TELEPHONE ENCOUNTER
From: Didier Craft  To: KASSY Morales  Sent: 2/23/2023 6:45 AM CST  Subject: Maritza Toney RX to [facility]     good morning. [facility] not [facility] coupons for the jardiance abd they want $1600 for the RX with my insurance. [facility] ? I believe I can get the RX for $10 there with a coupon.  Thank you

## 2023-02-23 NOTE — TELEPHONE ENCOUNTER
Pt sent follow up [employer] message:    \"good morning. [facility] will not accept coupons for the jardiance abd they want $1600 for the RX with my insurance. Can you send this RX to [facility] in [facility] instead ? I believe I can get the RX for $10 there with a coupon. Thank you\"      Called and spoke with [facility]. Confirmed they cannot accept saving cards/coupons. Sent into [facility].     [facility] and spoke with pharmacist. States they received prescription but it is not going through due to \"refill too soon\"    Will send [employer] message to pt to see if he picked it up elsewhere

## 2023-02-23 NOTE — TELEPHONE ENCOUNTER
Endo staff: can we check with patient's insurance to see if this is a deductible issue? Would coupons work in his situation? Thank you!

## 2023-03-24 ENCOUNTER — PATIENT MESSAGE (OUTPATIENT)
Dept: ENDOCRINOLOGY CLINIC | Facility: CLINIC | Age: 58
End: 2023-03-24

## 2023-03-31 NOTE — TELEPHONE ENCOUNTER
Spoke to [facility] to request labs - lab results dtd 3/16/23 will be faxed to [facility]    Lab results placed in provider's folder

## 2023-04-03 ENCOUNTER — TELEPHONE (OUTPATIENT)
Dept: ENDOCRINOLOGY CLINIC | Facility: CLINIC | Age: 58
End: 2023-04-03

## 2023-04-05 RX ORDER — LEVOTHYROXINE SODIUM 0.1 MG/1
100 TABLET ORAL
Qty: 90 TABLET | Refills: 1 | Status: SHIPPED | OUTPATIENT
Start: 2023-04-05

## 2023-04-05 NOTE — TELEPHONE ENCOUNTER
[employer] prescription for pump supplies and diabetic supplies signed 2/6/23 re-faxed to [employer] at [phone]

## 2023-04-24 ENCOUNTER — PATIENT MESSAGE (OUTPATIENT)
Dept: ENDOCRINOLOGY CLINIC | Facility: CLINIC | Age: 58
End: 2023-04-24

## 2023-04-24 RX ORDER — LANCETS
EACH MISCELLANEOUS
Qty: 1 EACH | Refills: 0 | Status: SHIPPED | OUTPATIENT
Start: 2023-04-24

## 2023-04-24 RX ORDER — LANCING DEVICE/LANCETS
KIT MISCELLANEOUS
Qty: 1 KIT | Refills: 0 | Status: SHIPPED | OUTPATIENT
Start: 2023-04-24

## 2023-04-28 RX ORDER — INSULIN ASPART 100 [IU]/ML
INJECTION, SOLUTION INTRAVENOUS; SUBCUTANEOUS
Qty: 120 ML | Refills: 0 | Status: SHIPPED | OUTPATIENT
Start: 2023-04-28

## 2023-04-30 ENCOUNTER — WALK IN (OUTPATIENT)
Dept: URGENT CARE | Age: 58
End: 2023-04-30

## 2023-04-30 DIAGNOSIS — Z11.1 SCREENING-PULMONARY TB: Primary | ICD-10-CM

## 2023-04-30 PROCEDURE — 86580 TB INTRADERMAL TEST: CPT | Performed by: NURSE PRACTITIONER

## 2023-05-02 ENCOUNTER — WALK IN (OUTPATIENT)
Dept: URGENT CARE | Age: 58
End: 2023-05-02

## 2023-05-02 DIAGNOSIS — Z11.1 ENCOUNTER FOR PPD SKIN TEST READING: Primary | ICD-10-CM

## 2023-05-02 LAB
INDURATION: 0 MM (ref 0–?)
SKIN TEST RESULT: NEGATIVE

## 2023-05-02 PROCEDURE — X1094 NO CHARGE VISIT: HCPCS | Performed by: NURSE PRACTITIONER

## 2023-05-08 ENCOUNTER — PATIENT MESSAGE (OUTPATIENT)
Dept: ENDOCRINOLOGY CLINIC | Facility: CLINIC | Age: 58
End: 2023-05-08

## 2023-05-09 NOTE — TELEPHONE ENCOUNTER
From: Mariaa Mercado  To: Hallie Solis MD  Sent: 5/8/2023 5:32 AM CDT  Subject: June 19th    Hi. If something opens up with Dr Jennifer Carlos in June 19th, I happen to be off work that day and could move my appt with Dr Jennifer Carlos that is currently scheduled for June 29th.  If nothing is available , I will meet that spot on June 29th but if you need someone for a fill in on June 19th please let me know and I will take that one instead

## 2023-06-20 RX ORDER — EMPAGLIFLOZIN 25 MG/1
1 TABLET, FILM COATED ORAL DAILY
Qty: 30 TABLET | Refills: 0 | Status: SHIPPED | OUTPATIENT
Start: 2023-06-20

## 2023-06-29 ENCOUNTER — OFFICE VISIT (OUTPATIENT)
Dept: ENDOCRINOLOGY CLINIC | Facility: CLINIC | Age: 58
End: 2023-06-29

## 2023-06-29 VITALS
BODY MASS INDEX: 34 KG/M2 | SYSTOLIC BLOOD PRESSURE: 110 MMHG | DIASTOLIC BLOOD PRESSURE: 82 MMHG | WEIGHT: 214 LBS | HEART RATE: 116 BPM

## 2023-06-29 DIAGNOSIS — E11.65 UNCONTROLLED TYPE 2 DIABETES MELLITUS WITH HYPERGLYCEMIA, WITH LONG-TERM CURRENT USE OF INSULIN (HCC): Primary | ICD-10-CM

## 2023-06-29 DIAGNOSIS — Z79.4 UNCONTROLLED TYPE 2 DIABETES MELLITUS WITH HYPERGLYCEMIA, WITH LONG-TERM CURRENT USE OF INSULIN (HCC): Primary | ICD-10-CM

## 2023-06-29 LAB
CARTRIDGE LOT#: ABNORMAL NUMERIC
GLUCOSE BLOOD: 320
HEMOGLOBIN A1C: 7.3 % (ref 4.3–5.6)
TEST STRIP LOT #: NORMAL NUMERIC

## 2023-06-29 PROCEDURE — 3074F SYST BP LT 130 MM HG: CPT | Performed by: INTERNAL MEDICINE

## 2023-06-29 PROCEDURE — 3051F HG A1C>EQUAL 7.0%<8.0%: CPT | Performed by: INTERNAL MEDICINE

## 2023-06-29 PROCEDURE — 3079F DIAST BP 80-89 MM HG: CPT | Performed by: INTERNAL MEDICINE

## 2023-06-29 PROCEDURE — 82947 ASSAY GLUCOSE BLOOD QUANT: CPT | Performed by: INTERNAL MEDICINE

## 2023-06-29 PROCEDURE — 83036 HEMOGLOBIN GLYCOSYLATED A1C: CPT | Performed by: INTERNAL MEDICINE

## 2023-06-29 PROCEDURE — 99214 OFFICE O/P EST MOD 30 MIN: CPT | Performed by: INTERNAL MEDICINE

## 2023-06-29 RX ORDER — SEMAGLUTIDE 1.34 MG/ML
1 INJECTION, SOLUTION SUBCUTANEOUS WEEKLY
Qty: 9 ML | Refills: 1 | Status: SHIPPED | OUTPATIENT
Start: 2023-06-29

## 2023-07-06 ENCOUNTER — TELEPHONE (OUTPATIENT)
Dept: ENDOCRINOLOGY CLINIC | Facility: CLINIC | Age: 58
End: 2023-07-06

## 2023-07-06 NOTE — TELEPHONE ENCOUNTER
Received fax form [employer] attached is CMN form for Pump supplies, form placed in provider folder for review.

## 2023-07-07 ENCOUNTER — PATIENT MESSAGE (OUTPATIENT)
Dept: ENDOCRINOLOGY CLINIC | Facility: CLINIC | Age: 58
End: 2023-07-07

## 2023-07-10 RX ORDER — INSULIN LISPRO 100 [IU]/ML
120 INJECTION, SOLUTION INTRAVENOUS; SUBCUTANEOUS DAILY
Qty: 100 ML | Refills: 2 | Status: SHIPPED | OUTPATIENT
Start: 2023-07-10

## 2023-07-17 ENCOUNTER — PATIENT MESSAGE (OUTPATIENT)
Dept: ENDOCRINOLOGY CLINIC | Facility: CLINIC | Age: 58
End: 2023-07-17

## 2023-07-18 NOTE — TELEPHONE ENCOUNTER
Received CMN from [employer]. Completed and placed in providers folder. They are also requesting lov notes. Per last visit, dx is DM2. Per patients mychart message, he needs dx of DM1 to get new pump. Please advise if okay to switch Dx on LOV.

## 2023-07-19 RX ORDER — EMPAGLIFLOZIN 25 MG/1
1 TABLET, FILM COATED ORAL DAILY
Qty: 30 TABLET | Refills: 2 | Status: SHIPPED | OUTPATIENT
Start: 2023-07-19

## 2023-07-20 NOTE — TELEPHONE ENCOUNTER
Form was faxed to [employer] at [phone] with updated LOV Note 6/29/23. Received Confirmation: Success.  Fax sent to scanning

## 2023-07-21 ENCOUNTER — MED REC SCAN ONLY (OUTPATIENT)
Dept: ENDOCRINOLOGY CLINIC | Facility: CLINIC | Age: 58
End: 2023-07-21

## 2023-07-24 ENCOUNTER — TELEPHONE (OUTPATIENT)
Dept: ENDOCRINOLOGY CLINIC | Facility: CLINIC | Age: 58
End: 2023-07-24

## 2023-07-24 RX ORDER — LANCETS
EACH MISCELLANEOUS
Qty: 102 EACH | Refills: 0 | Status: SHIPPED | OUTPATIENT
Start: 2023-07-24

## 2023-07-24 NOTE — TELEPHONE ENCOUNTER
Pharmacy requesting refill     Accu-Chek FastClix Lancets 102's Use as directed to check blood sugar levels 102 each 0

## 2023-08-30 ENCOUNTER — TELEPHONE (OUTPATIENT)
Dept: ENDOCRINOLOGY CLINIC | Facility: CLINIC | Age: 58
End: 2023-08-30

## 2023-09-08 ENCOUNTER — TELEPHONE (OUTPATIENT)
Dept: ENDOCRINOLOGY CLINIC | Facility: CLINIC | Age: 58
End: 2023-09-08

## 2023-09-11 ENCOUNTER — TELEPHONE (OUTPATIENT)
Dept: ENDOCRINOLOGY CLINIC | Facility: CLINIC | Age: 58
End: 2023-09-11

## 2023-09-18 RX ORDER — LEVOTHYROXINE SODIUM 0.1 MG/1
100 TABLET ORAL
Qty: 90 TABLET | Refills: 1 | Status: SHIPPED | OUTPATIENT
Start: 2023-09-18 | End: 2023-09-18

## 2023-09-18 RX ORDER — LEVOTHYROXINE SODIUM 0.1 MG/1
100 TABLET ORAL
Qty: 90 TABLET | Refills: 1 | Status: SHIPPED | OUTPATIENT
Start: 2023-09-18

## 2023-09-21 RX ORDER — INSULIN ASPART 100 [IU]/ML
INJECTION, SOLUTION INTRAVENOUS; SUBCUTANEOUS
Qty: 120 ML | Refills: 2 | Status: SHIPPED | OUTPATIENT
Start: 2023-09-21

## 2023-09-25 RX ORDER — LISINOPRIL 40 MG/1
40 TABLET ORAL DAILY
Qty: 30 TABLET | Refills: 5 | Status: SHIPPED | OUTPATIENT
Start: 2023-09-25

## 2023-10-16 RX ORDER — EMPAGLIFLOZIN 25 MG/1
1 TABLET, FILM COATED ORAL DAILY
Qty: 30 TABLET | Refills: 2 | Status: SHIPPED | OUTPATIENT
Start: 2023-10-16

## 2023-11-10 ENCOUNTER — OFFICE VISIT (OUTPATIENT)
Dept: ENDOCRINOLOGY CLINIC | Facility: CLINIC | Age: 58
End: 2023-11-10
Payer: COMMERCIAL

## 2023-11-10 VITALS
WEIGHT: 209 LBS | DIASTOLIC BLOOD PRESSURE: 75 MMHG | BODY MASS INDEX: 33 KG/M2 | HEART RATE: 109 BPM | SYSTOLIC BLOOD PRESSURE: 128 MMHG

## 2023-11-10 DIAGNOSIS — Z79.4 UNCONTROLLED TYPE 2 DIABETES MELLITUS WITH HYPERGLYCEMIA, WITH LONG-TERM CURRENT USE OF INSULIN (HCC): Primary | ICD-10-CM

## 2023-11-10 DIAGNOSIS — E11.65 UNCONTROLLED TYPE 2 DIABETES MELLITUS WITH HYPERGLYCEMIA, WITH LONG-TERM CURRENT USE OF INSULIN (HCC): Primary | ICD-10-CM

## 2023-11-10 LAB
CARTRIDGE EXPIRATION DATE: ABNORMAL DATE
CARTRIDGE LOT#: ABNORMAL NUMERIC
GLUCOSE BLOOD: 138
HEMOGLOBIN A1C: 8.4 % (ref 4.3–5.6)
TEST STRIP EXPIRATION DATE: NORMAL DATE
TEST STRIP LOT #: NORMAL NUMERIC

## 2023-11-10 PROCEDURE — 99214 OFFICE O/P EST MOD 30 MIN: CPT | Performed by: INTERNAL MEDICINE

## 2023-11-10 PROCEDURE — 82947 ASSAY GLUCOSE BLOOD QUANT: CPT | Performed by: INTERNAL MEDICINE

## 2023-11-10 PROCEDURE — 83036 HEMOGLOBIN GLYCOSYLATED A1C: CPT | Performed by: INTERNAL MEDICINE

## 2023-11-10 PROCEDURE — 3078F DIAST BP <80 MM HG: CPT | Performed by: INTERNAL MEDICINE

## 2023-11-10 PROCEDURE — 3074F SYST BP LT 130 MM HG: CPT | Performed by: INTERNAL MEDICINE

## 2023-11-10 RX ORDER — SEMAGLUTIDE 2.68 MG/ML
2 INJECTION, SOLUTION SUBCUTANEOUS WEEKLY
Qty: 9 ML | Refills: 1 | Status: SHIPPED | OUTPATIENT
Start: 2023-11-10

## 2023-11-12 ENCOUNTER — PATIENT MESSAGE (OUTPATIENT)
Dept: ENDOCRINOLOGY CLINIC | Facility: CLINIC | Age: 58
End: 2023-11-12

## 2023-11-13 RX ORDER — KETOCONAZOLE 20 MG/G
CREAM TOPICAL
Qty: 30 G | Refills: 1 | Status: SHIPPED | OUTPATIENT
Start: 2023-11-13

## 2023-11-13 NOTE — TELEPHONE ENCOUNTER
From: Xi Kiser  To: Cory Gilliam  Sent: 11/12/2023 4:46 AM CST  Subject: I forgot to mention at my visit    Hi Dr Anca Russell, I am sorry but I forgot to ask you for a prescription. .... I have what you call \"jock itch\" and I was wondering if you can prescribe an anti fungal cream for me ? I would appreciate if you could prescribe one with refills? Thank you and good seeing you. By the way, my pump is acting much better now since you made those changes. Much appreciated, I hope my A1c will be below 7 at my next appointment.  Karli Congress

## 2023-11-16 ENCOUNTER — TELEPHONE (OUTPATIENT)
Dept: GASTROENTEROLOGY | Age: 58
End: 2023-11-16

## 2023-11-29 RX ORDER — LANCETS
EACH MISCELLANEOUS
Qty: 102 EACH | Refills: 0 | Status: SHIPPED | OUTPATIENT
Start: 2023-11-29

## 2023-12-08 RX ORDER — KETOCONAZOLE 20 MG/G
CREAM TOPICAL
Qty: 30 G | Refills: 1 | Status: SHIPPED | OUTPATIENT
Start: 2023-12-08

## 2024-01-09 RX ORDER — EMPAGLIFLOZIN 25 MG/1
1 TABLET, FILM COATED ORAL DAILY
Qty: 30 TABLET | Refills: 2 | Status: SHIPPED | OUTPATIENT
Start: 2024-01-09

## 2024-01-09 NOTE — TELEPHONE ENCOUNTER
LOV: 11/10/23    RTC: 4 Months    FU: 6/19/24    Last Refill: 10/16/23    3 Month Supply Pending

## 2024-01-12 PROBLEM — K86.2 PANCREATIC CYST: Status: ACTIVE | Noted: 2024-01-12

## 2024-01-15 ENCOUNTER — APPOINTMENT (OUTPATIENT)
Dept: GASTROENTEROLOGY | Age: 59
End: 2024-01-15

## 2024-01-15 ENCOUNTER — LAB SERVICES (OUTPATIENT)
Dept: LAB | Age: 59
End: 2024-01-15

## 2024-01-15 VITALS
SYSTOLIC BLOOD PRESSURE: 121 MMHG | DIASTOLIC BLOOD PRESSURE: 77 MMHG | HEIGHT: 67 IN | BODY MASS INDEX: 32.02 KG/M2 | HEART RATE: 85 BPM | WEIGHT: 204 LBS

## 2024-01-15 DIAGNOSIS — K86.89 PANCREATIC INSUFFICIENCY: ICD-10-CM

## 2024-01-15 DIAGNOSIS — K86.1 IDIOPATHIC CHRONIC PANCREATITIS (CMD): Primary | ICD-10-CM

## 2024-01-15 DIAGNOSIS — Z83.79 FAMILY HISTORY OF CHRONIC PANCREATITIS: ICD-10-CM

## 2024-01-15 DIAGNOSIS — K86.2 PANCREATIC CYST: ICD-10-CM

## 2024-01-15 LAB
25(OH)D3+25(OH)D2 SERPL-MCNC: 33.6 NG/ML (ref 30–100)
ALBUMIN SERPL-MCNC: 3.8 G/DL (ref 3.6–5.1)
ALBUMIN/GLOB SERPL: 1 {RATIO} (ref 1–2.4)
ALP SERPL-CCNC: 116 UNITS/L (ref 45–117)
ALT SERPL-CCNC: 46 UNITS/L
ANION GAP SERPL CALC-SCNC: 10 MMOL/L (ref 7–19)
AST SERPL-CCNC: 28 UNITS/L
BASOPHILS # BLD: 0.1 K/MCL (ref 0–0.3)
BASOPHILS NFR BLD: 1 %
BILIRUB SERPL-MCNC: 0.6 MG/DL (ref 0.2–1)
BUN SERPL-MCNC: 14 MG/DL (ref 6–20)
BUN/CREAT SERPL: 15 (ref 7–25)
CALCIUM SERPL-MCNC: 9.2 MG/DL (ref 8.4–10.2)
CHLORIDE SERPL-SCNC: 102 MMOL/L (ref 97–110)
CO2 SERPL-SCNC: 29 MMOL/L (ref 21–32)
CREAT SERPL-MCNC: 0.92 MG/DL (ref 0.67–1.17)
DEPRECATED RDW RBC: 37.5 FL (ref 39–50)
EGFRCR SERPLBLD CKD-EPI 2021: >90 ML/MIN/{1.73_M2}
EOSINOPHIL # BLD: 0.4 K/MCL (ref 0–0.5)
EOSINOPHIL NFR BLD: 4 %
ERYTHROCYTE [DISTWIDTH] IN BLOOD: 12.5 % (ref 11–15)
FASTING DURATION TIME PATIENT: ABNORMAL H
FERRITIN SERPL-MCNC: 180 NG/ML (ref 26–388)
GLOBULIN SER-MCNC: 4 G/DL (ref 2–4)
GLUCOSE SERPL-MCNC: 156 MG/DL (ref 70–99)
HCT VFR BLD CALC: 48.4 % (ref 39–51)
HGB BLD-MCNC: 16.1 G/DL (ref 13–17)
IMM GRANULOCYTES # BLD AUTO: 0 K/MCL (ref 0–0.2)
IMM GRANULOCYTES # BLD: 0 %
INR PPP: 1.1
LYMPHOCYTES # BLD: 2.3 K/MCL (ref 1–4)
LYMPHOCYTES NFR BLD: 28 %
MCH RBC QN AUTO: 27.9 PG (ref 26–34)
MCHC RBC AUTO-ENTMCNC: 33.3 G/DL (ref 32–36.5)
MCV RBC AUTO: 83.9 FL (ref 78–100)
MONOCYTES # BLD: 0.5 K/MCL (ref 0.3–0.9)
MONOCYTES NFR BLD: 7 %
NEUTROPHILS # BLD: 4.9 K/MCL (ref 1.8–7.7)
NEUTROPHILS NFR BLD: 60 %
NRBC BLD MANUAL-RTO: 0 /100 WBC
PLATELET # BLD AUTO: 290 K/MCL (ref 140–450)
POTASSIUM SERPL-SCNC: 4.6 MMOL/L (ref 3.4–5.1)
PROT SERPL-MCNC: 7.8 G/DL (ref 6.4–8.2)
PROTHROMBIN TIME: 11.9 SEC (ref 9.7–11.8)
RBC # BLD: 5.77 MIL/MCL (ref 4.5–5.9)
SODIUM SERPL-SCNC: 136 MMOL/L (ref 135–145)
WBC # BLD: 8.2 K/MCL (ref 4.2–11)

## 2024-01-15 PROCEDURE — 84590 ASSAY OF VITAMIN A: CPT | Performed by: CLINICAL MEDICAL LABORATORY

## 2024-01-15 PROCEDURE — 82787 IGG 1 2 3 OR 4 EACH: CPT | Performed by: CLINICAL MEDICAL LABORATORY

## 2024-01-15 PROCEDURE — 99214 OFFICE O/P EST MOD 30 MIN: CPT | Performed by: INTERNAL MEDICINE

## 2024-01-15 PROCEDURE — 82306 VITAMIN D 25 HYDROXY: CPT | Performed by: CLINICAL MEDICAL LABORATORY

## 2024-01-15 PROCEDURE — 85025 COMPLETE CBC W/AUTO DIFF WBC: CPT | Performed by: INTERNAL MEDICINE

## 2024-01-15 PROCEDURE — 81223 CFTR GENE FULL SEQUENCE: CPT | Performed by: CLINICAL MEDICAL LABORATORY

## 2024-01-15 PROCEDURE — 80053 COMPREHEN METABOLIC PANEL: CPT | Performed by: INTERNAL MEDICINE

## 2024-01-15 PROCEDURE — 82728 ASSAY OF FERRITIN: CPT | Performed by: INTERNAL MEDICINE

## 2024-01-15 PROCEDURE — 85610 PROTHROMBIN TIME: CPT | Performed by: INTERNAL MEDICINE

## 2024-01-15 PROCEDURE — 81404 MOPATH PROCEDURE LEVEL 5: CPT | Performed by: CLINICAL MEDICAL LABORATORY

## 2024-01-15 PROCEDURE — 36415 COLL VENOUS BLD VENIPUNCTURE: CPT | Performed by: INTERNAL MEDICINE

## 2024-01-15 PROCEDURE — 81405 MOPATH PROCEDURE LEVEL 6: CPT | Performed by: CLINICAL MEDICAL LABORATORY

## 2024-01-15 RX ORDER — SEMAGLUTIDE 2.68 MG/ML
INJECTION, SOLUTION SUBCUTANEOUS
COMMUNITY

## 2024-01-15 RX ORDER — PANCRELIPASE 24000; 76000; 120000 [USP'U]/1; [USP'U]/1; [USP'U]/1
CAPSULE, DELAYED RELEASE PELLETS ORAL
Qty: 360 CAPSULE | Refills: 3 | Status: SHIPPED | OUTPATIENT
Start: 2024-01-15

## 2024-01-18 ENCOUNTER — TELEPHONE (OUTPATIENT)
Dept: GASTROENTEROLOGY | Age: 59
End: 2024-01-18

## 2024-01-18 LAB — IGG4 SER-MCNC: 111 MG/DL (ref 1–123)

## 2024-01-19 LAB
ANNOTATION COMMENT IMP: NORMAL
RETINYL PALMITATE SERPL-MCNC: <0.02 MG/L (ref 0–0.1)
VIT A SERPL-MCNC: 0.6 MG/L (ref 0.3–1.2)

## 2024-01-24 LAB
DEL-DUP INTERP PATIENT-IMP: POSITIVE
SPECIMEN TYPE: NORMAL

## 2024-02-07 ENCOUNTER — TELEPHONE (OUTPATIENT)
Dept: GASTROENTEROLOGY | Age: 59
End: 2024-02-07

## 2024-02-08 ENCOUNTER — HOSPITAL ENCOUNTER (OUTPATIENT)
Dept: GENERAL RADIOLOGY | Age: 59
Discharge: HOME | End: 2024-02-08
Attending: INTERNAL MEDICINE

## 2024-02-08 ENCOUNTER — HOSPITAL ENCOUNTER (OUTPATIENT)
Dept: MRI IMAGING | Age: 59
Discharge: HOME | End: 2024-02-08
Attending: INTERNAL MEDICINE

## 2024-02-08 DIAGNOSIS — K86.89 PANCREATIC INSUFFICIENCY: ICD-10-CM

## 2024-02-08 DIAGNOSIS — K86.1 IDIOPATHIC CHRONIC PANCREATITIS (CMD): ICD-10-CM

## 2024-02-08 DIAGNOSIS — K86.2 PANCREATIC CYST: ICD-10-CM

## 2024-02-08 PROCEDURE — 74183 MRI ABD W/O CNTR FLWD CNTR: CPT

## 2024-02-08 PROCEDURE — A9585 GADOBUTROL INJECTION: HCPCS | Performed by: INTERNAL MEDICINE

## 2024-02-08 PROCEDURE — 10002805 HB CONTRAST AGENT: Performed by: INTERNAL MEDICINE

## 2024-02-08 PROCEDURE — 77080 DXA BONE DENSITY AXIAL: CPT

## 2024-02-08 RX ORDER — GADOBUTROL 604.72 MG/ML
9 INJECTION INTRAVENOUS ONCE
Status: COMPLETED | OUTPATIENT
Start: 2024-02-08 | End: 2024-02-08

## 2024-02-08 RX ADMIN — GADOBUTROL 9 ML: 604.72 INJECTION INTRAVENOUS at 15:49

## 2024-03-11 RX ORDER — LEVOTHYROXINE SODIUM 0.1 MG/1
100 TABLET ORAL
Qty: 90 TABLET | Refills: 1 | Status: SHIPPED | OUTPATIENT
Start: 2024-03-11

## 2024-03-26 NOTE — TELEPHONE ENCOUNTER
Endocrine Refill protocol for oral and injectable diabetic medications      Protocol Criteria:    -Appointment with Endocrinology completed in the last 6 months or scheduled in the next 3 months    -A1c result <8.5% in the past 6 months      Verify the above has been completed or scheduled in the appropriate timeline. If so can send a 90 day supply with 1 refill.            Last completed office visit:  11/10/2023  Next scheduled Follow up: 06/072024  Last A1c result: 11/10/2023 (8.4)

## 2024-03-27 RX ORDER — EMPAGLIFLOZIN 25 MG/1
1 TABLET, FILM COATED ORAL DAILY
Qty: 90 TABLET | Refills: 1 | Status: SHIPPED | OUTPATIENT
Start: 2024-03-27

## 2024-04-19 NOTE — TELEPHONE ENCOUNTER
From: Micah Mcintyre  To: Edgardo Eastman MD  Sent: 12/7/2020 3:05 PM CST  Subject: Visit Follow-up Question    No lala. I meant how do I download my pump info. I tried in the past and had a hard time. (E4) spontaneous

## 2024-04-25 NOTE — TELEPHONE ENCOUNTER
Current Outpatient Medications   Medication Sig Dispense Refill    insulin aspart (NOVOLOG) 100 Units/mL Injection Solution Inject 125 units subcutaneous daily via insulin pump 120 mL 2

## 2024-04-27 RX ORDER — INSULIN ASPART 100 [IU]/ML
INJECTION, SOLUTION INTRAVENOUS; SUBCUTANEOUS
Qty: 120 ML | Refills: 2 | Status: SHIPPED | OUTPATIENT
Start: 2024-04-27

## 2024-04-27 RX ORDER — INSULIN ASPART 100 [IU]/ML
INJECTION, SOLUTION INTRAVENOUS; SUBCUTANEOUS
Qty: 120 ML | Refills: 2 | Status: CANCELLED | OUTPATIENT
Start: 2024-04-27

## 2024-04-27 NOTE — TELEPHONE ENCOUNTER
Endocrine refill protocol for rapid acting, regular, intermediate, and mixed insulin:    Protocol Criteria:  Appointment with Endocrinology completed in the last 6 months or scheduled in the next 3 months     Verify appointment has been completed or scheduled in the appropriate timeline. If so can send a 90 day supply with 1 refill.   Verify A1C has been completed in the last 6 months and is <8.5%    -May substitute prescriptions for Novolog and Humalog unless documented allergy (pens and vials) at the same dose and concentration per insurance preference and provider protocol.   -May substitute prescriptions for Novolin R and Humulin R unless documented allergy (pens and vials) at the same dose and concentration per insurance preference and provider protocol.   -May substitute prescriptions for Novolin N and Humulin N unless documented allergy (pens and vials) at the same dose and concentration per insurance preference and provider protocol.   -May substitute prescriptions for Humulin and Novolin 70/30 insulin unless documented allergy at the same dose and concentration per insurance preference and provider protocol.    Last completed office visit: 11/10/23  Next scheduled Follow up:  6/7/24  Component      Latest Ref Rng 11/10/2023   HEMOGLOBIN A1C      4.3 - 5.6 % 8.4 !       Legend:  ! Abnormal

## 2024-04-29 NOTE — TELEPHONE ENCOUNTER
Endocrine refill protocol for basal insulins     Protocol Criteria:  - Appointment with Endocrinology completed in the last 6 months or scheduled in the next 3 months    - A1c result completed in the last 6 months and is <8.5%     Verify appointment has been completed or scheduled in the appropriate timeline. If so can send a 90 day supply with 1 refill per provider protocol.    Verify A1c has been completed within the last 6 months and is below 8.5%     Last completed office visit:11/10/23  Next scheduled Follow up: 06/07/24  Last A1c result: Last A1c value was 8.4% done 11/10/2023.

## 2024-04-30 NOTE — TELEPHONE ENCOUNTER
Spoke with the pharmacy. Novolog vials are on back order and are out of stock. The Novolog vials are covered by insurance but they are showing up as not covered because they're marked as out of stock. They do have a limited supply in the store, so they will be dispensing in order of what they have.     Spoke with patient. He has 6 Novolog vials left which will last him 6 weeks. Notified him that Novolog vials should still be covered, but are just backordered now. He will follow up with pharmacy again in a week or so to check on stock. If he's unable to get it, he will ask his insurance what alternatives are covered and let us know.

## 2024-05-13 RX ORDER — SEMAGLUTIDE 2.68 MG/ML
INJECTION, SOLUTION SUBCUTANEOUS
Qty: 9 ML | Refills: 1 | Status: SHIPPED | OUTPATIENT
Start: 2024-05-13

## 2024-05-13 NOTE — TELEPHONE ENCOUNTER
Endocrine Refill protocol for oral and injectable diabetic medications    Protocol Criteria:    -Appointment with Endocrinology completed in the last 6 months or scheduled in the next 3 months    -A1c result <8.5% in the past 6 months      Verify the above has been completed or scheduled in the appropriate timeline. If so can send a 90 day supply with 1 refill.     Last completed office visit: Visit date not found   Next scheduled Follow up:   Future Appointments   Date Time Provider Department Center   6/7/2024  8:00 AM Isufi, Marvina, APRN [facility]      Last A1c result: Last A1c value was 8.4% done 11/10/2023.

## 2024-06-07 ENCOUNTER — TELEPHONE (OUTPATIENT)
Dept: ENDOCRINOLOGY CLINIC | Facility: CLINIC | Age: 59
End: 2024-06-07

## 2024-06-07 ENCOUNTER — OFFICE VISIT (OUTPATIENT)
Dept: ENDOCRINOLOGY CLINIC | Facility: CLINIC | Age: 59
End: 2024-06-07
Payer: COMMERCIAL

## 2024-06-07 VITALS
WEIGHT: 203 LBS | BODY MASS INDEX: 31.86 KG/M2 | DIASTOLIC BLOOD PRESSURE: 75 MMHG | HEIGHT: 67 IN | HEART RATE: 99 BPM | SYSTOLIC BLOOD PRESSURE: 124 MMHG

## 2024-06-07 DIAGNOSIS — E11.65 UNCONTROLLED TYPE 2 DIABETES MELLITUS WITH HYPERGLYCEMIA, WITH LONG-TERM CURRENT USE OF INSULIN (HCC): Primary | ICD-10-CM

## 2024-06-07 DIAGNOSIS — Z79.4 UNCONTROLLED TYPE 2 DIABETES MELLITUS WITH HYPERGLYCEMIA, WITH LONG-TERM CURRENT USE OF INSULIN (HCC): Primary | ICD-10-CM

## 2024-06-07 LAB
GLUCOSE BLOOD: 140
HEMOGLOBIN A1C: 7.9 % (ref 4.3–5.6)
TEST STRIP EXPIRATION DATE: NORMAL DATE
TEST STRIP LOT #: NORMAL NUMERIC

## 2024-06-07 RX ORDER — TIRZEPATIDE 10 MG/.5ML
10 INJECTION, SOLUTION SUBCUTANEOUS WEEKLY
Qty: 2 ML | Refills: 0 | Status: SHIPPED | OUTPATIENT
Start: 2024-06-07

## 2024-06-07 RX ORDER — TIRZEPATIDE 10 MG/.5ML
10 INJECTION, SOLUTION SUBCUTANEOUS WEEKLY
Qty: 2 ML | Refills: 0 | Status: SHIPPED | OUTPATIENT
Start: 2024-06-07 | End: 2024-06-07

## 2024-06-07 NOTE — PROGRESS NOTES
Name: Gurpreet Dawkins  Date: 6/7/2024      Referring Physician: No ref. provider found       HISTORY OF PRESENT ILLNESS   Gurpreet Dawkins is a 58 year old male who presents for diabetes mellitus.  He has history of chronic pancreatitis s/p multiple ERCP and suspect that pancreatic damage is cause for DM.    Last ERCP was in 2013. It was noted that he has a cyst to pancreas, thus this is being monitored on an annual basis. He reports lack of pain sensation with each pancreatitis episode.       Prior HbA, C or glycohemoglobin were 7.2% 6/2014; 7.9% 9/2014; 7.7% 12/2014; 8.1% 4/2015; 8.4% 6/2016; 9.1% 1/2017; 9.5% 7/2017; 9.3% 2/2018; 9.4% 9/2018; 7.8% 12/2018; 8.4% 9/2019; 8.3% 10/2020; 8.3% 4/2021; 9.3% 7/2021; 7.3% 1/2022; 8.7% 8/19/2022; 8.4% 2/2023; 7.3% 6/2023; 8.4% 11/10/2023; 7.9% at POC today;      Dietary compliance: Good   Feels comfortable with carb counting     Breakfast: protein shake (low carb)    Lunch- roast beef sandwich    Dinner- meat with pasta    - does not like fish or chicken    Fruit- plum/peaches     Entering meal bolus entries around 10 mins prior to start of meal       Exercise: no   Polyuria/polydipsia: No  Blurred vision: No    Episodes of hypoglycemia: No  Blood Glucose:  Checking 4 times per day   Reviewed Medtronic Download    Medications for DM  Medtronic Pump 780G  Ozempic 2mg subcutaneous weekly -tolerating well;denies GI s/e   Jardiance 25mg PO daily     Auto mode 93%     Basal:  12A 1.70  10A 1.60  8P 1.60    I:CR  12A 4.0  5P 3.5    Sensitivity 25  Active Insulin Time 2.5 hours   Goal 110-120        Average glucose: 167 mg/dl     In target range: (70-180mg/dl): 68%     High glucose targets: (> 180mg/dl): 24%     Very high glucose targets: (> 250mg/dl): 8%     Low glucose targets: (less than 70mg/dl): 0%     Very Low glucose targets: (< 54mg/dl ): 0%       Continuous Glucose Monitoring Interpretation    Gurpreet Dawkins has undergone continuous glucose monitoring with the personal  Share[employer] continuous glucose monitor. The blood glucose tracings were evaluated for two weeks prior to office visit. His blood glucose tracings demonstrated postprandial hyperglycemia. He did not experience any hypoglycemia during the week of evaluation.  As a result of her/his testing his medications were adjusted per below.       REVIEW OF SYSTEMS  Eyes: Diabetic retinopathy present: No            Most recent visit to eye doctor in last 12 months: yes 10/2023 - followed by Dr. Zavala in [place]       CV: Cardiovascular disease present: No         Hypertension present: Yes         Hyperlipidemia present: Yes         Peripheral Vascular Disease present: No    : Nephropathy present: No    Neuro: Neuropathy present: Yes    Skin: Infection or ulceration: No    Osteoporosis: No    Thyroid disease: No      Medications:     Current Outpatient Medications:     OZEMPIC, 2 MG/DOSE, 8 MG/3ML Subcutaneous Solution Pen-injector, INJECT 2MG UNDER THE SKIN ONCE WEEKLY AS DIRECTED, Disp: 9 mL, Rfl: 1    insulin aspart (NOVOLOG) 100 Units/mL Injection Solution, Inject 125 units subcutaneous daily via insulin pump, Disp: 120 mL, Rfl: 2    Empagliflozin (JARDIANCE) 25 MG Oral Tab, Take 1 tablet by mouth daily., Disp: 90 tablet, Rfl: 1    Accu-Chek FastClix Lancets Does not apply Misc, Use as directed to check blood sugar levels, Disp: 102 each, Rfl: 0    Insulin Lispro (HUMALOG) 100 UNIT/ML Injection Solution, Inject 120 Units as directed daily. Via insulin pump, Disp: 100 mL, Rfl: 2    Accu-Chek FastClix Lancets Does not apply Misc, Lancing Device, Disp: 1 each, Rfl: 0    Lancets Misc. (ACCU-CHEK FASTCLIX LANCET) Does not apply Kit, Use as directed to check blood sugar, Disp: 1 kit, Rfl: 0    HUMALOG 100 UNIT/ML Subcutaneous Solution, INJECT SUBCUTANEOUSLY A  MAXIMUM  UNITS DAILY  VIA INSULIN PUMP, Disp: 120 mL, Rfl: 0    Glucose Blood (ACCU-CHEK GUIDE) In Vitro Strip, Check 3 times per day, Disp: 300 strip, Rfl:  1    Microlet Lancets Does not apply Misc, USE TO CHECK BLOOD SUGAR UP TO 8 TIMES PER DAY, Disp: 800 each, Rfl: 3    Blood Glucose Monitoring Suppl (CONTOUR NEXT MONITOR) w/Device Does not apply Kit, 1 each by Does not apply route daily., Disp: 1 kit, Rfl: 0    Blood Glucose Monitoring Suppl (CRISTIAN CONTOUR LINK MONITOR) W/DEVICE Does not apply Kit, , Disp: , Rfl:     levothyroxine 100 MCG Oral Tab, Take 1 tablet (100 mcg total) by mouth before breakfast., Disp: 90 tablet, Rfl: 1    ketoconazole 2 % External Cream, APPLY TOPICALLY TO THE AFFECTED AREA DAILY, Disp: 30 g, Rfl: 1    lisinopril 40 MG Oral Tab, Take 1 tablet (40 mg total) by mouth daily., Disp: 30 tablet, Rfl: 5    semaglutide (OZEMPIC, 1 MG/DOSE,) 4 MG/3ML Subcutaneous Solution Pen-injector, Inject 1 mg into the skin once a week. (Patient not taking: Reported on 6/7/2024), Disp: 9 mL, Rfl: 1    Pancrelipase, Lip-Prot-Amyl, (CREON) 50594-17631 units Oral Cap DR Particles, Take 3 capsules by mouth 3 (three) times daily with meals., Disp: 1080 capsule, Rfl: 1    ATORVASTATIN 40 MG Oral Tab, TAKE 1 TABLET BY MOUTH AT  NIGHT, Disp: 30 tablet, Rfl: 11    DULoxetine HCl 60 MG Oral Cap DR Particles, 1 capsule (60 mg total) daily., Disp: , Rfl:     Pancrelipase, Lip-Prot-Amyl, 45225-31005 units Oral Cap DR Particles, Take 2 capsules by mouth 3 (three) times daily with meals., Disp: , Rfl:     Cholecalciferol (VITAMIN D) 2000 UNITS Oral Cap, Take by mouth., Disp: , Rfl:     aspirin 81 MG Oral Tab, Take 1 tablet (81 mg total) by mouth daily., Disp: , Rfl:     Multiple Vitamins-Minerals (MULTIVITAMIN) Oral Liquid, Take  by mouth., Disp: , Rfl:     omega-3 fatty acids (FISH OIL) 1000 MG Oral Cap, Take  by mouth., Disp: , Rfl:      Allergies:   No Known Allergies    Social History:   Social History     Socioeconomic History    Marital status:    Tobacco Use    Smoking status: Former     Current packs/day: 0.00     Types: Cigarettes     Quit date: 8/1/2002      Years since quittin.8    Smokeless tobacco: Never   Substance and Sexual Activity    Alcohol use: Yes     Alcohol/week: 1.7 standard drinks of alcohol     Types: 2 Glasses of wine per week     Comment: once/month    Drug use: No   Other Topics Concern    Caffeine Concern Yes     Comment: 6cups coffee       Medical History:   Past Medical History:    Atrophic pancreas (HCC)    Diabetes mellitus (HCC)    Elevated liver function tests    Non-alcoholic fatty liver disease    Non-alcoholic fatty liver disease    Pancreatitis (HCC)    Type II or unspecified type diabetes mellitus without mention of complication, not stated as uncontrolled       Surgical history:   Past Surgical History:   Procedure Laterality Date    Ercp duct stent placement           PHYSICAL EXAM  /75   Pulse 99   Ht 5' 7\" (1.702 m)   Wt 203 lb (92.1 kg)   BMI 31.79 kg/m²     General Appearance:  alert, well developed, in no acute distress  Eyes:  normal conjunctivae, sclera., normal sclera and normal pupils  Throat/Neck: normal sound to voice.   Back: no kyphosis  Respiratory:  non-labored. no increased work of breathing.    Lymph Nodes:  No abnormal nodes noted  Skin:  normal moisture and skin texture  Hematologic:  no excessive bruising  Psychiatric:  oriented to time, self, and place        ASSESSMENT/PLAN:      1. Diabetes Mellitus Type 2, Uncontrolled  -Uncontrolled, HgA1c 7.9% today   -He has changed to 780 pump a month ago leading to some higher BG levels   -Discussed importance of glycemic control to prevent complications of diabetes  -Discussed complications of diabetes include retinopathy, neuropathy, nephropathy and cardiovascular disease  -Discussed importance of SBGM  -Discussed importance of low CHO diet    -Continue Jardiance 25mg PO daily   -stop Ozempic   - start Mounjaro 10mg once weekly - Risks and benefits reviewed. Verbalizes understanding.  - continue with current insulin pump settings     -discussed to increase  protein amt with each meal   -reviewed order to start eating foods   -discussed to start entering meal bolus entries 15-20mins prior to start of meal  - discussed to start entering correction bolus at bedtime if glucose readings >150   - discussed that he gives me an update on his BG readings on and tolerance to Mounjaro in 3 weeks.     -Normotensive   -normal lipids - on statin therapy   -Normal renal function   -UTD with optho -- will work on obtaining records   -he will have labs repeated with PCP soon - fax number given to have results faxed to us  - normal foot exam 2/20/2023       2. Hypothyroidism  -on Levothyroxine 75mcg PO daily  -Normal TFTs         RTC 3 months       6/7/2024  KASSY Watson

## 2024-06-07 NOTE — TELEPHONE ENCOUNTER
Received a fax of patients most recent eye exam dated on 06/19/2023 with Shabnam Thompson OD at [facility]. Eye exam was documented in patient's 'Diabetes Flowsheet' and placed in providers folder for review.

## 2024-06-07 NOTE — TELEPHONE ENCOUNTER
Called # provided below, spoke with representative & stated she will fax over recent visit MA provided fax #

## 2024-06-07 NOTE — PATIENT INSTRUCTIONS
A1C: 7.9% today --> decreased from 8.4% on 11/10/2023  Blood glucose: 140 in clinic today  Endocrinology fax# [phone]    Medications:   -continue Jardiance 25mg once daily   - stop Ozempic  - start Mounjaro 10mg once weekly     Medtronic Pump 780G  Basal:  12A 1.70  10A 1.60  8P 1.60    I:CR  12A 4.0  5P 3.5    Sensitivity 25  Active Insulin Time 2.5 hours   Goal 110-120    - let's increase protein portion with each meal   - let's start to eat meals in the following order:   - veggies first (preferably high in fiber)   - protein second (preferably lean - skinless chicken/fish/turkey)   - carbohydrates last (preferably complex- whole wheat products)      Please give me an update on your blood glucose reading in 3 weeks     Weight:  Wt Readings from Last 6 Encounters:   06/07/24 203 lb (92.1 kg)   11/10/23 209 lb (94.8 kg)   06/29/23 214 lb (97.1 kg)   02/20/23 222 lb (100.7 kg)   08/19/22 207 lb (93.9 kg)   01/05/22 207 lb 12.8 oz (94.3 kg)     A1C goal:  <7.0%    Blood sugar testing:  Continue using safeguard continuous glucometer    Blood sugar targets:  Before breakfast:  (preferably < 110)  Before meals OR 2 hours after meals: <180 (preferably <150)     Call for persistent blood sugars < 75 or > 200

## 2024-06-07 NOTE — TELEPHONE ENCOUNTER
Endo staff: please obtain last office visit notes from optho     Thank you!       Roselyn Riggs  Address: [address], [place] [address]  Phone: [phone]

## 2024-06-11 ENCOUNTER — OFFICE VISIT (OUTPATIENT)
Dept: URGENT CARE | Age: 59
End: 2024-06-11

## 2024-06-11 VITALS
TEMPERATURE: 99.1 F | RESPIRATION RATE: 14 BRPM | BODY MASS INDEX: 31.07 KG/M2 | OXYGEN SATURATION: 97 % | HEIGHT: 67 IN | DIASTOLIC BLOOD PRESSURE: 57 MMHG | WEIGHT: 197.97 LBS | SYSTOLIC BLOOD PRESSURE: 96 MMHG | HEART RATE: 111 BPM

## 2024-06-11 DIAGNOSIS — B96.89 BACTERIAL UPPER RESPIRATORY INFECTION: ICD-10-CM

## 2024-06-11 DIAGNOSIS — J06.9 BACTERIAL UPPER RESPIRATORY INFECTION: ICD-10-CM

## 2024-06-11 DIAGNOSIS — H10.023 OTHER MUCOPURULENT CONJUNCTIVITIS OF BOTH EYES: ICD-10-CM

## 2024-06-11 DIAGNOSIS — R05.1 ACUTE COUGH: Primary | ICD-10-CM

## 2024-06-11 LAB
FLUAV AG UPPER RESP QL IA.RAPID: NEGATIVE
FLUBV AG UPPER RESP QL IA.RAPID: NEGATIVE
SARS-COV+SARS-COV-2 AG RESP QL IA.RAPID: NOT DETECTED
TEST LOT EXPIRATION DATE: NORMAL
TEST LOT NUMBER: NORMAL

## 2024-06-11 RX ORDER — POLYMYXIN B SULFATE AND TRIMETHOPRIM 1; 10000 MG/ML; [USP'U]/ML
1 SOLUTION OPHTHALMIC EVERY 4 HOURS
Qty: 10 ML | Refills: 0 | Status: SHIPPED | OUTPATIENT
Start: 2024-06-11

## 2024-06-11 RX ORDER — AMOXICILLIN 875 MG/1
875 TABLET, COATED ORAL 2 TIMES DAILY
Qty: 14 TABLET | Refills: 0 | Status: SHIPPED | OUTPATIENT
Start: 2024-06-11 | End: 2024-06-18

## 2024-06-11 ASSESSMENT — ENCOUNTER SYMPTOMS
EYE REDNESS: 1
COUGH: 1
ENDOCRINE NEGATIVE: 1
ALLERGIC/IMMUNOLOGIC NEGATIVE: 1
GASTROINTESTINAL NEGATIVE: 1
PSYCHIATRIC NEGATIVE: 1
HEMATOLOGIC/LYMPHATIC NEGATIVE: 1
EYE DISCHARGE: 1
FEVER: 1
NEUROLOGICAL NEGATIVE: 1

## 2024-06-11 ASSESSMENT — VISUAL ACUITY: OU: 1

## 2024-06-14 ENCOUNTER — PATIENT MESSAGE (OUTPATIENT)
Dept: ENDOCRINOLOGY CLINIC | Facility: CLINIC | Age: 59
End: 2024-06-14

## 2024-06-17 ENCOUNTER — TELEPHONE (OUTPATIENT)
Dept: ENDOCRINOLOGY CLINIC | Facility: CLINIC | Age: 59
End: 2024-06-17

## 2024-06-18 NOTE — TELEPHONE ENCOUNTER
From: Gurpreet Dawkins  To: Rachel Garcia  Sent: 6/14/2024 4:24 PM CDT  Subject: Mounjaro 10 mg     Hi Dr Garcia. I was in this past Friday and she wants to switch me from ozempic to mounjaro 10 mg which is fine. She gave me a paper RX because it’s hard to find a place to fill but I have not had luck so I contacted my online pharmacy to see if they will fill it. They will be contacting you or whoever can approve the new RX. She wants to see me back for follow up to see how I’m doing as we can adjust it up or down as needed as I’m at the max of 2 grams with the ozempic. My A1c was 7.9. Better but still much room for improvement

## 2024-06-20 ENCOUNTER — PATIENT MESSAGE (OUTPATIENT)
Dept: ENDOCRINOLOGY CLINIC | Facility: CLINIC | Age: 59
End: 2024-06-20

## 2024-06-20 DIAGNOSIS — E11.65 UNCONTROLLED TYPE 2 DIABETES MELLITUS WITH HYPERGLYCEMIA, WITH LONG-TERM CURRENT USE OF INSULIN (HCC): ICD-10-CM

## 2024-06-20 DIAGNOSIS — Z79.4 UNCONTROLLED TYPE 2 DIABETES MELLITUS WITH HYPERGLYCEMIA, WITH LONG-TERM CURRENT USE OF INSULIN (HCC): ICD-10-CM

## 2024-06-24 RX ORDER — SEMAGLUTIDE 2.4 MG/.75ML
2.4 INJECTION, SOLUTION SUBCUTANEOUS WEEKLY
Qty: 4 EACH | Refills: 2 | Status: SHIPPED | OUTPATIENT
Start: 2024-06-24 | End: 2024-07-09

## 2024-06-24 NOTE — TELEPHONE ENCOUNTER
JUDSON patient providing update. I checked care everywhere but do not see results there. Checked faxes and do not see. Asked patient to please have them sent to [phone].     Also please see update on GLP1, seems he has been continuing ozempic while he searches for mounjaro.

## 2024-06-24 NOTE — TELEPHONE ENCOUNTER
From: Gurpreet Dawkins  To: Elio Fuentes  Sent: 6/20/2024 5:04 AM CDT  Subject: Mounjaro 10 mg Rx    Good morning....I saw my PCP yesterday for my physical and blood work yesterday so they will be sending you those results. Also, I cannot find a place to fill the new RX you gave me for mounjaro but I will still keep trying as you gave me a paper copy but I will keep using 2.0 mg of ozempic in the meantime as I have lost 10% of my current weight since beginning those meds. If you think there are other options to try such as wegovy then let me know. Thank you , BRIANA DAWKINS

## 2024-06-25 ENCOUNTER — TELEPHONE (OUTPATIENT)
Dept: ENDOCRINOLOGY CLINIC | Facility: CLINIC | Age: 59
End: 2024-06-25

## 2024-06-25 NOTE — TELEPHONE ENCOUNTER
semaglutide-weight management (WEGOVY) 2.4 MG/0.75ML Subcutaneous Solution Auto-injector, Inject 0.75 mL (2.4 mg total) into the skin once a week., Disp: 4 each, Rfl: 2    KEY: [id number]

## 2024-06-28 NOTE — TELEPHONE ENCOUNTER
Replied patient to let him know that he has refills left on wegovy and offered paper prescription for ozempic.     Per patient recent ALT elevated. Asked patient where he completed the labs or to fax us the results.

## 2024-07-01 NOTE — TELEPHONE ENCOUNTER
I called Dr. Shepard's office. Requested copy of most recent lab work. They will fax attn to [facility].     I called the pharmacy. Wegovy requires a PA. Per TE 6/25/24 PA was sumbitted via covermymeds.

## 2024-07-02 ENCOUNTER — TELEPHONE (OUTPATIENT)
Dept: ENDOCRINOLOGY CLINIC | Facility: CLINIC | Age: 59
End: 2024-07-02

## 2024-07-02 NOTE — TELEPHONE ENCOUNTER
Received fax from [facility] attached is a copy of pt's labs from 06/24/2024. Fax placed in provider folder for review and signature.

## 2024-07-06 NOTE — TELEPHONE ENCOUNTER
Per Covermymeds: Your prior authorization for Wegovy has been denied.  RETURN TO DASHBOARD  When applicable, information about how to complete an appeal for this patient will be sent to you. Please also see the determination letter provided by the payer/PBM for more information.    Message from plan: Details of this decision are provided on the physician outcome notice which has been faxed to the number on file.    Endo [facility] staff, please check faxes for PA denial information and inform [facility] of reason for denial.     [facility] - it seems he has been having trouble with GLP1 coverage and backorder. Per message he was on ozempic and would like to try mounjaro. Cannot find mounjaro script. PA for wegovy denied. Will await ppw but I suspect possibly weight loss meds are not covered or he may not meet the criteria for weight loss meds. Would you like to try a different GLP1?

## 2024-07-09 RX ORDER — SEMAGLUTIDE 2.68 MG/ML
2 INJECTION, SOLUTION SUBCUTANEOUS WEEKLY
Qty: 9 ML | Refills: 0 | Status: SHIPPED | OUTPATIENT
Start: 2024-07-09

## 2024-07-09 RX ORDER — INSULIN ASPART 100 [IU]/ML
INJECTION, SOLUTION INTRAVENOUS; SUBCUTANEOUS
Qty: 120 ML | Refills: 2 | Status: SHIPPED | OUTPATIENT
Start: 2024-07-09

## 2024-07-09 NOTE — TELEPHONE ENCOUNTER
Update noted. Please place denial letter in my folder once available to review rationale.     Unfortunately there is no other glp-1 medication that would be at equivalent strength as currently ozempic 2mg weekly is.       Patient was called and informed of wegovy denial. Discussed that we will plan to continue with ozempic 2mg weekly until able to find mounjaro in the future. He does have a paper prescription that he has use to fill once he is able to find it.     Patient verbalizes understanding and is in agreement with plan.     Refill for ozempic and aspart sent to pharmacy per patient's request.

## 2024-07-15 ENCOUNTER — PATIENT MESSAGE (OUTPATIENT)
Dept: ENDOCRINOLOGY CLINIC | Facility: CLINIC | Age: 59
End: 2024-07-15

## 2024-07-16 NOTE — TELEPHONE ENCOUNTER
From: Gurpreet Dawkins  To: Elio Fuentes  Sent: 7/15/2024 6:57 PM CDT  Subject: I found mounjaro 10 mg finally     Hi. This is a copy of the message I sent to Dr Garcia and wanted to send it to you too as you were the one who recommended switching over from ozempic to mounjaro ….. here it is and thank you for your help…… I dr Garcia. I got through ( FINALLY ) to my [facility] insurance and spoke to a lady who said [facility] the 10 mg mounjaro that the nurse practitioner recommended for me at my visit last month. All we need is the pre authorization approved by you first and then you can send the RX to [facility] for a 90 day supply The lady mentioned a fax number of [phone] and that once you complete the pre authorization you can send it to the number on that sheet on their form. Let me know that you got this information. I am looking forward to moving to mounjaro as I am at the max with oxempic at 2 mgs …. Luis dawkins   Showing 1 of 1    Please allow up to 5-7 business days for a reply.  Reply  Interoperability GuideTerms and ConditionsContact UsHigh Contrast Theme  MyChart Helpline: [phone]  MyChart® licensed from [employer]© 1999 - 202402

## 2024-08-07 ENCOUNTER — TELEPHONE (OUTPATIENT)
Dept: ENDOCRINOLOGY CLINIC | Facility: CLINIC | Age: 59
End: 2024-08-07

## 2024-08-07 NOTE — TELEPHONE ENCOUNTER
Received [employer] ppw for pump supplies. Placed in providers folder for signature.

## 2024-08-14 NOTE — TELEPHONE ENCOUNTER
Empagliflozin (JARDIANCE) 25 MG Oral Tab, Take 1 tablet by mouth daily., Disp: 90 tablet, Rfl: 1

## 2024-08-16 RX ORDER — LISINOPRIL 40 MG/1
TABLET ORAL
Qty: 30 TABLET | Refills: 5 | Status: SHIPPED | OUTPATIENT
Start: 2024-08-16

## 2024-08-16 RX ORDER — EMPAGLIFLOZIN 25 MG/1
1 TABLET, FILM COATED ORAL DAILY
Qty: 90 TABLET | Refills: 1 | Status: SHIPPED | OUTPATIENT
Start: 2024-08-16

## 2024-08-16 NOTE — TELEPHONE ENCOUNTER
Endocrine Refill protocol for oral antihypertensive medications    Protocol Criteria:  PASSED    -Appointment with Endocrinology completed in the last 6 months or scheduled in the next 3 months    -BMP or CMP has been completed in the last 12 months     -GFR is greater than or equal to 50    Verify the above has been completed or scheduled in the appropriate timeline. If so can send a 90 day supply with 1 refill.   Verify BMP or CMP has been completed in last year  Verify last GFR result     Last completed office visit:6/7/2024 Elio Fuentes APRN   Next scheduled Follow up: No future appointments.   Last BMP or CMP completion date:  Lab Results   Component Value Date    GFRAA >60 06/04/2016    GFRNAA >60 06/04/2016

## 2024-08-16 NOTE — TELEPHONE ENCOUNTER
Endocrine Refill protocol for oral and injectable diabetic medications    Protocol Criteria:  PASSED  -Appointment with Endocrinology completed in the last 6 months or scheduled in the next 3 months    -A1c result below 8.5% in the past 6 months      Verify the above has been completed or scheduled in the appropriate timeline. If so can send a 90 day supply with 1 refill.     Last completed office visit: 6/7/2024 Elio Fuentes APRN   Next scheduled Follow up: No future appointments.   Last A1c result: Last A1c value was 7.9% done 6/7/2024.

## 2024-08-30 ENCOUNTER — PATIENT MESSAGE (OUTPATIENT)
Dept: ENDOCRINOLOGY CLINIC | Facility: CLINIC | Age: 59
End: 2024-08-30

## 2024-08-30 RX ORDER — TIRZEPATIDE 10 MG/.5ML
10 INJECTION, SOLUTION SUBCUTANEOUS WEEKLY
Qty: 6 ML | Refills: 0 | Status: SHIPPED | OUTPATIENT
Start: 2024-08-30

## 2024-08-31 NOTE — TELEPHONE ENCOUNTER
From: Gurpreet Dawkins  To: Elio Fuentes  Sent: 8/30/2024 6:50 AM CDT  Subject: mounjaro 10 mg    Hi......[facility] moun[facility]o then told me they do not have it in stock....can you try resending the RX to [facility] as I feel the ozempic 2.0 mg is no longer being as effective as it was....thank you

## 2024-08-31 NOTE — TELEPHONE ENCOUNTER
Chart reviewed and looks like pt was switched to Mounjaro 10 mg.   There was a [facility] on [facility] and most likely not filled due to prior shortage issue.  [facility] as [facility].

## 2024-09-04 NOTE — TELEPHONE ENCOUNTER
Tirzepatide (MOUNJARO) 10 MG/0.5ML Subcutaneous Solution Pen-injector, Inject 10 mg into the skin once a week., Disp: 6 mL, Rfl: 0

## 2024-09-05 ENCOUNTER — PATIENT MESSAGE (OUTPATIENT)
Dept: ENDOCRINOLOGY CLINIC | Facility: CLINIC | Age: 59
End: 2024-09-05

## 2024-09-05 NOTE — TELEPHONE ENCOUNTER
Endocrine Refill protocol for oral and injectable diabetic medications    Protocol Criteria:  PASSED  Reason: N/A  -Appointment with Endocrinology completed in the last 6 months or scheduled in the next 3 months    -A1c result below 8.5% in the past 6 months      Verify the above has been completed or scheduled in the appropriate timeline. If so can send a 90 day supply with 1 refill.     Last completed office visit: 6/7/2024 Elio Fuentes APRN   Next scheduled Follow up: No future appointments. [facility] sent  Last A1c result: Last A1c value was 7.9% done 6/7/2024.

## 2024-09-06 RX ORDER — TIRZEPATIDE 10 MG/.5ML
10 INJECTION, SOLUTION SUBCUTANEOUS WEEKLY
Qty: 6 ML | Refills: 0 | Status: SHIPPED | OUTPATIENT
Start: 2024-09-06

## 2024-09-06 NOTE — TELEPHONE ENCOUNTER
Spoke to pharmacist Geraldine at [facility] to confirm: ok to dispense 10mg mounjaro - pharmacist stated they have no records of the patient titrating up on mounjaro  Pharmacist will prepare mounjaro 10mg  [facility] sent updating patient - patient advised to contact clinic with questions

## 2024-09-06 NOTE — TELEPHONE ENCOUNTER
From: Gurpreet Dawkins  To: Elio Fuentes  Sent: 9/5/2024 7:20 AM CDT  Subject: Mounjaro on hold     Morning. They were suppose to send my mounjaro as it finally got approved but they sent this message saying they have to talk to the provider to make sure it’s safe. I think they know I will stop the ozempic once I get the mounjaro but they are trying to contact you. I tried sending you a picture but all it says is they can’t ship until they talk to provider for some reason

## 2024-09-07 NOTE — TELEPHONE ENCOUNTER
Per 06/07/24 chart note from APRN, pt was transitioned to Mounjaro 10 mg.   Responded to patient with this confirmation.

## 2024-10-19 NOTE — TELEPHONE ENCOUNTER
Endocrine refill protocol for basal insulins     Protocol Criteria: PASSED Reason: N/A    If all below requirements are met, send a 90-day supply with 1 refill per provider protocol.       Verify Appointment with Endocrinology completed in the last 6 months or scheduled in the next 3 months.  Verify A1C has been completed within the last 6 months and is below 8.5%     Last completed office visit:6/7/2024 Elio Fuentes APRN   Next scheduled Follow up:   Future Appointments   Date Time Provider Department Center   11/11/2024 12:45 PM Elio Fuentes APRN [facility]      Last A1c result: Last A1c value was 7.9% done 6/7/2024.

## 2024-10-21 RX ORDER — INSULIN ASPART 100 [IU]/ML
INJECTION, SOLUTION INTRAVENOUS; SUBCUTANEOUS
Qty: 110 ML | Refills: 3 | Status: SHIPPED | OUTPATIENT
Start: 2024-10-21

## 2024-11-11 ENCOUNTER — OFFICE VISIT (OUTPATIENT)
Dept: ENDOCRINOLOGY CLINIC | Facility: CLINIC | Age: 59
End: 2024-11-11
Payer: COMMERCIAL

## 2024-11-11 VITALS
HEIGHT: 67 IN | HEART RATE: 105 BPM | SYSTOLIC BLOOD PRESSURE: 100 MMHG | BODY MASS INDEX: 29.62 KG/M2 | WEIGHT: 188.69 LBS | DIASTOLIC BLOOD PRESSURE: 69 MMHG

## 2024-11-11 DIAGNOSIS — Z79.4 UNCONTROLLED TYPE 2 DIABETES MELLITUS WITH HYPERGLYCEMIA, WITH LONG-TERM CURRENT USE OF INSULIN (HCC): Primary | ICD-10-CM

## 2024-11-11 DIAGNOSIS — E11.65 UNCONTROLLED TYPE 2 DIABETES MELLITUS WITH HYPERGLYCEMIA, WITH LONG-TERM CURRENT USE OF INSULIN (HCC): Primary | ICD-10-CM

## 2024-11-11 LAB
GLUCOSE BLOOD: 239
HEMOGLOBIN A1C: 7.5 % (ref 4.3–5.6)
TEST STRIP LOT #: NORMAL NUMERIC

## 2024-11-11 PROCEDURE — 3008F BODY MASS INDEX DOCD: CPT | Performed by: NURSE PRACTITIONER

## 2024-11-11 PROCEDURE — 3078F DIAST BP <80 MM HG: CPT | Performed by: NURSE PRACTITIONER

## 2024-11-11 PROCEDURE — 99214 OFFICE O/P EST MOD 30 MIN: CPT | Performed by: NURSE PRACTITIONER

## 2024-11-11 PROCEDURE — 3051F HG A1C>EQUAL 7.0%<8.0%: CPT | Performed by: NURSE PRACTITIONER

## 2024-11-11 PROCEDURE — 95251 CONT GLUC MNTR ANALYSIS I&R: CPT | Performed by: NURSE PRACTITIONER

## 2024-11-11 PROCEDURE — 82947 ASSAY GLUCOSE BLOOD QUANT: CPT | Performed by: NURSE PRACTITIONER

## 2024-11-11 PROCEDURE — 3074F SYST BP LT 130 MM HG: CPT | Performed by: NURSE PRACTITIONER

## 2024-11-11 PROCEDURE — 83036 HEMOGLOBIN GLYCOSYLATED A1C: CPT | Performed by: NURSE PRACTITIONER

## 2024-11-11 NOTE — PROGRESS NOTES
Name: Gurpreet Dawkins  Date: 11/11/2024      Referring Physician: No ref. provider found       HISTORY OF PRESENT ILLNESS   Gurpreet Dawkins is a 58 year old male who presents for diabetes mellitus.  He has history of chronic pancreatitis s/p multiple ERCP and suspect that pancreatic damage is cause for DM.    Last ERCP was in 2013. It was noted that he has a cyst to pancreas, thus this is being monitored on an annual basis. He reports lack of pain sensation with each pancreatitis episode.     Patient notes that he has been feeling fair. Few months ago he has developed a skin rash that is primarily in abdomen area. Skin is intact, however erythematous and itchy. He has not tried used any symptom alleviating remedies at home and has not been seen by PCP for this. He notes that he is uncertain if rash was present prior to starting Mounjaro.     He continues to follow a low carb diet as much as able and staying active per usual. he has been compliant with taking all diabetes medications.         Prior HbA, C or glycohemoglobin were 7.2% 6/2014; 7.9% 9/2014; 7.7% 12/2014; 8.1% 4/2015; 8.4% 6/2016; 9.1% 1/2017; 9.5% 7/2017; 9.3% 2/2018; 9.4% 9/2018; 7.8% 12/2018; 8.4% 9/2019; 8.3% 10/2020; 8.3% 4/2021; 9.3% 7/2021; 7.3% 1/2022; 8.7% 8/19/2022; 8.4% 2/2023; 7.3% 6/2023; 8.4% 11/10/2023; 7.9% 6/7/2024; 7.5% at POC today;        Dietary compliance: fair;   -Eating carb based foods for breakfast most of the time  - eats lunch around 10am and snacks between lunch/dinner meal.   Feels comfortable with carb counting       Exercise: no   Polyuria/polydipsia: No  Blurred vision: No    Episodes of hypoglycemia: No  Blood Glucose:  Checking 4 times per day   Reviewed Medtronic Download    Medications for DM  Mounjaro 10 subcutaneous weekly - has developed rash to abd area; does not remember when the rash started and if he had this while on ozempic therapy    Jardiance 25mg PO daily     Medtronic Pump 780G    Basal:  12A 1.70  10A  1.60  8P 1.60    I:CR  12A 4.0  10A 3.5 --> time changed (from 5pm to 10am)    Sensitivity 25  Active Insulin Time 2.5 hours   Goal 110-120        Average glucose: 196 mg/dl     In target range: (70-180mg/dl): 69%     High glucose targets: (> 180mg/dl): 27%     Very high glucose targets: (> 250mg/dl): 4%     Low glucose targets: (less than 70mg/dl): 0%     Very Low glucose targets: (< 54mg/dl ): 0%       Total daily dose: 45 units   Bolus amt/day: 18.3 units   Auto correction/day: 12.2 units   Auto basal/day: 26.7 units       Continuous Glucose Monitoring Interpretation    Gurpreet Dawkins has undergone continuous glucose monitoring with the personal [employer] continuous glucose monitor. The blood glucose tracings were evaluated for two weeks prior to office visit. His blood glucose tracings demonstrated postprandial hyperglycemia most significantly occurring with lunch meal. He did not experience any hypoglycemia during the week of evaluation.  As a result of her/his testing his medications were adjusted per below.       REVIEW OF SYSTEMS  Eyes: Diabetic retinopathy present: No            Most recent visit to eye doctor in last 12 months: yes 10/2023 - followed by Sally in [place]       CV: Cardiovascular disease present: No         Hypertension present: Yes         Hyperlipidemia present: Yes         Peripheral Vascular Disease present: No    : Nephropathy present: No    Neuro: Neuropathy present: Yes    Skin: Infection or ulceration: No    Osteoporosis: No    Thyroid disease: No      Medications:     Current Outpatient Medications:     NOVOLOG 100 UNIT/ML Injection Solution, INJECT 125 UNITS UNDER THE SKIN DAILY VIA INSULIN PUMP, Disp: 110 mL, Rfl: 3    insulin aspart (NOVOLOG) 100 Units/mL Injection Solution, Inject up to 125 units daily via insulin pump, Disp: 120 mL, Rfl: 1    Tirzepatide (MOUNJARO) 10 MG/0.5ML Subcutaneous Solution Pen-injector, Inject 10 mg into the skin once a week., Disp: 6 mL,  Rfl: 0    LISINOPRIL 40 MG Oral Tab, TAKE 1 TABLET(40 MG) BY MOUTH DAILY( GENERIC EQUIVALENT FOR PRINIVIL, ZESTRIL), Disp: 30 tablet, Rfl: 5    Empagliflozin (JARDIANCE) 25 MG Oral Tab, Take 1 tablet by mouth daily., Disp: 90 tablet, Rfl: 1    semaglutide (OZEMPIC, 2 MG/DOSE,) 8 MG/3ML Subcutaneous Solution Pen-injector, Inject 2 mg into the skin once a week., Disp: 9 mL, Rfl: 0    insulin aspart 100 Units/mL Injection Solution, Inject 125 units subcutaneous daily via insulin pump, Disp: 120 mL, Rfl: 2    levothyroxine 100 MCG Oral Tab, Take 1 tablet (100 mcg total) by mouth before breakfast., Disp: 90 tablet, Rfl: 1    ketoconazole 2 % External Cream, APPLY TOPICALLY TO THE AFFECTED AREA DAILY, Disp: 30 g, Rfl: 1    Accu-Chek FastClix Lancets Does not apply Misc, Use as directed to check blood sugar levels, Disp: 102 each, Rfl: 0    Accu-Chek FastClix Lancets Does not apply Misc, Lancing Device, Disp: 1 each, Rfl: 0    Lancets Misc. (ACCU-CHEK FASTCLIX LANCET) Does not apply Kit, Use as directed to check blood sugar, Disp: 1 kit, Rfl: 0    Pancrelipase, Lip-Prot-Amyl, (CREON) 43316-92570 units Oral Cap DR Particles, Take 3 capsules by mouth 3 (three) times daily with meals., Disp: 1080 capsule, Rfl: 1    Glucose Blood (ACCU-CHEK GUIDE) In Vitro Strip, Check 3 times per day, Disp: 300 strip, Rfl: 1    ATORVASTATIN 40 MG Oral Tab, TAKE 1 TABLET BY MOUTH AT  NIGHT, Disp: 30 tablet, Rfl: 11    Microlet Lancets Does not apply Misc, USE TO CHECK BLOOD SUGAR UP TO 8 TIMES PER DAY, Disp: 800 each, Rfl: 3    Blood Glucose Monitoring Suppl (CONTOUR NEXT MONITOR) w/Device Does not apply Kit, 1 each by Does not apply route daily., Disp: 1 kit, Rfl: 0    DULoxetine HCl 60 MG Oral Cap DR Particles, 1 capsule (60 mg total) daily., Disp: , Rfl:     Pancrelipase, Lip-Prot-Amyl, 30867-90545 units Oral Cap DR Particles, Take 2 capsules by mouth 3 (three) times daily with meals., Disp: , Rfl:     Cholecalciferol (VITAMIN D) 2000 UNITS  Oral Cap, Take by mouth., Disp: , Rfl:     aspirin 81 MG Oral Tab, Take 1 tablet (81 mg total) by mouth daily., Disp: , Rfl:     Blood Glucose Monitoring Suppl (Gravity Jack CONTOUR LINK MONITOR) W/DEVICE Does not apply Kit, , Disp: , Rfl:     Multiple Vitamins-Minerals (MULTIVITAMIN) Oral Liquid, Take  by mouth., Disp: , Rfl:     omega-3 fatty acids (FISH OIL) 1000 MG Oral Cap, Take  by mouth., Disp: , Rfl:      Allergies:   No Known Allergies    Social History:   Social History     Socioeconomic History    Marital status:    Tobacco Use    Smoking status: Former     Current packs/day: 0.00     Types: Cigarettes     Quit date: 2002     Years since quittin.2    Smokeless tobacco: Never   Substance and Sexual Activity    Alcohol use: Yes     Alcohol/week: 1.7 standard drinks of alcohol     Types: 2 Glasses of wine per week     Comment: once/month    Drug use: No   Other Topics Concern    Caffeine Concern Yes     Comment: 6cups coffee       Medical History:   Past Medical History:    Atrophic pancreas (HCC)    Diabetes mellitus (HCC)    Elevated liver function tests    Non-alcoholic fatty liver disease    Non-alcoholic fatty liver disease    Pancreatitis (HCC)    Type II or unspecified type diabetes mellitus without mention of complication, not stated as uncontrolled       Surgical history:   Past Surgical History:   Procedure Laterality Date    Ercp duct stent placement           PHYSICAL EXAM  /69   Pulse 105   Ht 5' 7\" (1.702 m)   Wt 188 lb 11.2 oz (85.6 kg)   BMI 29.55 kg/m²     General Appearance:  alert, well developed, in no acute distress  Eyes:  normal conjunctivae, sclera., normal sclera and normal pupils  Throat/Neck: normal sound to voice.   Back: no kyphosis  Respiratory:  non-labored. no increased work of breathing.    Lymph Nodes:  No abnormal nodes noted  Skin:  normal moisture and skin texture  Hematologic:  no excessive bruising  Psychiatric:  oriented to time, self, and  place    Diabetes Foot Exam:  Right barefoot skin diabetic exam is abnormal with diabetic monofilament/sensation testing abnormal    Bilateral barefoot skin diabetic exam is normal, visualized feet and the appearance is normal.  Pulsation pedal pulse exam of both lower legs/feet is normal as well.        ASSESSMENT/PLAN:      1. Diabetes Mellitus Type 2, Uncontrolled  -Uncontrolled, HgA1c 7.5% today   -Discussed importance of glycemic control to prevent complications of diabetes  -Discussed complications of diabetes include retinopathy, neuropathy, nephropathy and cardiovascular disease  -Discussed importance of SBGM  -Discussed importance of low CHO diet    -Continue Jardiance 25mg PO daily in AM  - start rotating Mounjaro 10mg once weekly injections to outer thighs or back of upper arms. Avoid abdomen area.   - pump settings adjusted per above current insulin pump settings     -discussed option of switching to U200 insulin. Patient is open to trying out. This is being done to help body better absorb insulin.       -discussed to increase protein amt with each meal   - settings for U200 insulin given to patient today to enter once he is ready to start U200 insulin.    -Normotensive   -normal lipids - on statin therapy   -Normal renal function   -UTD with optho   - normal foot exam today 11/11/2024      U200 insulin pump settings:  Basal:  12A 0.85  10A 0.80  8P 0.80    I:CR  12A 8  10a 7    Sensitivity 50  Active Insulin Time 2.5 hours   Goal 110-120      2. Hypothyroidism  -on Levothyroxine 75mcg PO daily  -Normal TFTs           RTC 3 months       11/11/2024  KASSY Watson

## 2024-11-11 NOTE — PATIENT INSTRUCTIONS
A1C: 7.5% today --> previously was 7.9% on 6/7/2024  Blood glucose: 239 in clinic today    Medications:   - continue Jardiance 25mg once daily   -  start injecting Mounjaro 10mg once weekly to outer thighs or back of upper arms   --> if rash continues, stop and lets evaluate rash for the next 2 weeks and give me an update on this     Basal:  12A 1.70  10A 1.60  8P 1.60    I:CR  12A 4.0  10a --> 3.5  5P 3.5    Sensitivity 25  Active Insulin Time 2.5 hours   Goal 110-120      U 200 insulin settings:  Basal:  12A 0.85  10A 0.80  8P 0.80    I:CR  12A 8  10a 7    Sensitivity 50  Active Insulin Time 2.5 hours   Goal 110-120

## 2024-11-13 RX ORDER — TIRZEPATIDE 10 MG/.5ML
10 INJECTION, SOLUTION SUBCUTANEOUS WEEKLY
Qty: 6 ML | Refills: 1 | OUTPATIENT
Start: 2024-11-13

## 2024-11-13 NOTE — TELEPHONE ENCOUNTER
Patient had developed skin rash and currently alternating injection site area to r/o mounjaro as causative for rash.

## 2024-11-13 NOTE — TELEPHONE ENCOUNTER
Endocrine Refill protocol for oral and injectable diabetic medications    Protocol Criteria:  PASSED  Reason: N/A    If all below requirements are met, send a 90-day supply with 1 refill per provider protocol.    Verify appointment with Endocrinology completed in the last 6 months or scheduled in the next 3 months.  Verify A1C has been completed within the last 6 months and is below 8.5%     Last completed office visit: 11/11/2024 Elio Fuentes APRN   Next scheduled Follow up: No future appointments.   Last A1c result: Last A1c value was 7.5% done 11/11/2024.

## 2024-11-15 RX ORDER — INSULIN LISPRO 200 [IU]/ML
125 INJECTION, SOLUTION SUBCUTANEOUS DAILY
Qty: 113 ML | Refills: 0 | Status: SHIPPED | OUTPATIENT
Start: 2024-11-15 | End: 2025-02-13

## 2024-11-19 ENCOUNTER — PATIENT MESSAGE (OUTPATIENT)
Dept: ENDOCRINOLOGY CLINIC | Facility: CLINIC | Age: 59
End: 2024-11-19

## 2024-11-22 RX ORDER — SEMAGLUTIDE 2.68 MG/ML
2 INJECTION, SOLUTION SUBCUTANEOUS WEEKLY
Qty: 9 ML | Refills: 1 | Status: SHIPPED | OUTPATIENT
Start: 2024-11-22

## 2024-12-06 DIAGNOSIS — Z79.4 UNCONTROLLED TYPE 2 DIABETES MELLITUS WITH HYPERGLYCEMIA, WITH LONG-TERM CURRENT USE OF INSULIN (HCC): Primary | ICD-10-CM

## 2024-12-06 DIAGNOSIS — E11.65 UNCONTROLLED TYPE 2 DIABETES MELLITUS WITH HYPERGLYCEMIA, WITH LONG-TERM CURRENT USE OF INSULIN (HCC): Primary | ICD-10-CM

## 2024-12-09 RX ORDER — INSULIN ASPART 100 [IU]/ML
INJECTION, SOLUTION INTRAVENOUS; SUBCUTANEOUS
Qty: 120 ML | Refills: 1 | Status: SHIPPED | OUTPATIENT
Start: 2024-12-09

## 2024-12-09 NOTE — TELEPHONE ENCOUNTER
Per LOV on 11/11 continue with medtronic pump 780    Endocrine refill protocol for rapid acting, regular, intermediate, and mixed insulin:    Protocol Criteria:  PASSED Reason: N/A    If all below requirements are met, send a 90-day supply with 1 refill per provider protocol.    Verify appointment with Endocrinology completed in the last 6 months or scheduled in the next 3 months.  Verify A1C has been completed within the last 6 months and is below 8.5%    -May substitute prescriptions for Novolog and Humalog unless documented allergy (pens and vials) at the same dose and concentration per insurance preference and provider protocol.   -May substitute prescriptions for Novolin R and Humulin R unless documented allergy (pens and vials) at the same dose and concentration per insurance preference and provider protocol.   -May substitute prescriptions for Novolin N and Humulin N unless documented allergy (pens and vials) at the same dose and concentration per insurance preference and provider protocol.   -May substitute prescriptions for Humulin and Novolin 70/30 insulin unless documented allergy at the same dose and concentration per insurance preference and provider protocol.    Last completed office visit: 11/11/2024 Elio Fuentes APRN   Next scheduled Follow up: No future appointments.   Last A1C result: 7.5% done 11/11/2024.

## 2024-12-21 RX ORDER — PEN NEEDLE, DIABETIC 30 GX3/16"
1 NEEDLE, DISPOSABLE MISCELLANEOUS
Qty: 100 EACH | Refills: 0 | Status: SHIPPED | OUTPATIENT
Start: 2024-12-21

## 2024-12-21 NOTE — TELEPHONE ENCOUNTER
Patient called   He is in [place] and forgot insulin  pump   He is getting supplies and insulin shipped  Needs subcutaneous insulin to [facility] in the meantime  I told him I will call back since I am with a patient   Called back x 2 --> no reply     Jaja: can you please try calling again with the angela marques amd send pended insulin once pharmacy is confirmed    Recommend   Tresiba 35 units daily   Novolog TID with meals based on ICR and CF   Insulin pen needles and insulin pended , please check what pharmacy will he like it to be sent to     To resume pump 24 hours after last tresiba injection     Thanks

## 2024-12-21 NOTE — TELEPHONE ENCOUNTER
Called patient and states he is not able to write down regimen as he is at a store.  Pt states he has access to [employer] and requested for instruction to be sent there.  RX sent to preferred pharmacy ([phone]).  Gave ICR Instruction for U100 insulin based on APRN's last chart note.     U200 insulin pump settings:  Basal:  12A 0.85  10A 0.80  8P 0.80     I:CR  12A 8  10a 7     Sensitivity 50  Active Insulin Time 2.5 hours   Goal 110-120    U100    Basal:  12A 1.70  10A 1.60  8P 1.60   I:CR  12A 4.0  10a --> 3.5  5P 3.5   Sensitivity 25  Active Insulin Time 2.5 hours   Goal 110-120

## 2025-01-29 RX ORDER — LEVOTHYROXINE SODIUM 100 UG/1
100 TABLET ORAL
Qty: 90 TABLET | Refills: 1 | Status: SHIPPED | OUTPATIENT
Start: 2025-01-29

## 2025-01-29 NOTE — TELEPHONE ENCOUNTER
Endocrine refill protocol for medications for hypothyroidism and hyperthyroidism    Protocol Criteria:  PASSED Reason: N/A    If all below requirements are met, send a 90-day supply with 1 refill per provider protocol.    Verify appointment with Endocrinology completed in the last 12 months or scheduled in the next 6 months.    Normal TSH result in the past 12 months   Review recent telephone encounters and mychart communications with patient to ensure a dose change has not occurred since last office visit that was not updated in the medication history list     Last completed office visit:11/11/2024 Elio Fuentes APRN   Next scheduled Follow up: No future appointments.   Last TSH result:   TSH (S)   Date Value Ref Range Status   06/04/2016 3.14 0.34 - 5.60 uIU/mL Final     Comment:     Ambulatory patients with a serum TSH above 2.5  mIU/ml,when confirmed by a repeat TSH measurement,  may be in the early stages of thyroid failure  (subclinical hypothyroidism)especially if  thyroperoxidase antibodies are elevated.  Recommend repeat TSH  and thyroperoxidase  antibody testing, as clinically indicated.

## 2025-02-03 NOTE — TELEPHONE ENCOUNTER
Endocrine Refill protocol for oral and injectable diabetic medications    Protocol Criteria:  PASSED  Reason: N/A    If all below requirements are met, send a 90-day supply with 1 refill per provider protocol.    Verify appointment with Endocrinology completed in the last 6 months or scheduled in the next 3 months.  Verify A1C has been completed within the last 6 months and is below 8.5%     Last completed office visit: 11/11/2024 Elio Fuentes APRN   Next scheduled Follow up: No future appointments. [facility] sent  Last A1c result: Last A1c value was 7.5% done 11/11/2024.

## 2025-05-03 NOTE — TELEPHONE ENCOUNTER
LOV 11/11/2024  RTC (upcoming 6/19/2025)  Last refill 1/29/2025    KASSY Ballard-please review and sign pended refill if agreeable.

## 2025-05-05 RX ORDER — LEVOTHYROXINE SODIUM 100 UG/1
100 TABLET ORAL
Qty: 90 TABLET | Refills: 1 | Status: SHIPPED | OUTPATIENT
Start: 2025-05-05

## 2025-05-30 ENCOUNTER — TELEPHONE (OUTPATIENT)
Dept: ENDOCRINOLOGY CLINIC | Facility: CLINIC | Age: 60
End: 2025-05-30

## 2025-05-30 NOTE — TELEPHONE ENCOUNTER
INSULIN ASPART 100 Units/mL Injection Solution INJECT UP  UNITS DAILY VIA INSULIN PUMP. 120 mL 1   DRUG CHANGE REQUEST:  MESSAGE: plan does not cover medication prescribed. Per RX benefit plan alternative medications include: NOVOLOG please fax back approval along with strength directions qty and refills

## 2025-06-02 RX ORDER — INSULIN ASPART 100 [IU]/ML
INJECTION, SOLUTION INTRAVENOUS; SUBCUTANEOUS
Qty: 120 ML | Refills: 0 | Status: SHIPPED | OUTPATIENT
Start: 2025-06-02

## 2025-06-19 ENCOUNTER — OFFICE VISIT (OUTPATIENT)
Dept: ENDOCRINOLOGY CLINIC | Facility: CLINIC | Age: 60
End: 2025-06-19
Payer: COMMERCIAL

## 2025-06-19 VITALS
HEIGHT: 67 IN | DIASTOLIC BLOOD PRESSURE: 74 MMHG | BODY MASS INDEX: 29.35 KG/M2 | SYSTOLIC BLOOD PRESSURE: 104 MMHG | WEIGHT: 187 LBS | HEART RATE: 99 BPM

## 2025-06-19 DIAGNOSIS — Z79.4 UNCONTROLLED TYPE 2 DIABETES MELLITUS WITH HYPERGLYCEMIA, WITH LONG-TERM CURRENT USE OF INSULIN (HCC): Primary | ICD-10-CM

## 2025-06-19 DIAGNOSIS — E11.65 UNCONTROLLED TYPE 2 DIABETES MELLITUS WITH HYPERGLYCEMIA, WITH LONG-TERM CURRENT USE OF INSULIN (HCC): Primary | ICD-10-CM

## 2025-06-19 LAB
GLUCOSE BLOOD: 177
HEMOGLOBIN A1C: 7.7 % (ref 4.3–5.6)
TEST STRIP LOT #: NORMAL NUMERIC

## 2025-06-19 PROCEDURE — 83036 HEMOGLOBIN GLYCOSYLATED A1C: CPT | Performed by: NURSE PRACTITIONER

## 2025-06-19 PROCEDURE — 82947 ASSAY GLUCOSE BLOOD QUANT: CPT | Performed by: NURSE PRACTITIONER

## 2025-06-19 PROCEDURE — 3074F SYST BP LT 130 MM HG: CPT | Performed by: NURSE PRACTITIONER

## 2025-06-19 PROCEDURE — 3051F HG A1C>EQUAL 7.0%<8.0%: CPT | Performed by: NURSE PRACTITIONER

## 2025-06-19 PROCEDURE — 99214 OFFICE O/P EST MOD 30 MIN: CPT | Performed by: NURSE PRACTITIONER

## 2025-06-19 PROCEDURE — 3008F BODY MASS INDEX DOCD: CPT | Performed by: NURSE PRACTITIONER

## 2025-06-19 PROCEDURE — 3078F DIAST BP <80 MM HG: CPT | Performed by: NURSE PRACTITIONER

## 2025-06-19 PROCEDURE — 95251 CONT GLUC MNTR ANALYSIS I&R: CPT | Performed by: NURSE PRACTITIONER

## 2025-06-19 RX ORDER — LEVOTHYROXINE SODIUM 100 UG/1
100 TABLET ORAL
Qty: 90 TABLET | Refills: 1 | Status: SHIPPED | OUTPATIENT
Start: 2025-06-19

## 2025-06-19 RX ORDER — SEMAGLUTIDE 2.68 MG/ML
2 INJECTION, SOLUTION SUBCUTANEOUS WEEKLY
Qty: 9 ML | Refills: 1 | Status: SHIPPED | OUTPATIENT
Start: 2025-06-19

## 2025-06-19 RX ORDER — LISINOPRIL 40 MG/1
40 TABLET ORAL DAILY
Qty: 90 TABLET | Refills: 0 | Status: SHIPPED | OUTPATIENT
Start: 2025-06-19

## 2025-06-19 NOTE — TELEPHONE ENCOUNTER
Endocrine refill protocol for medications for hypothyroidism and hyperthyroidism    Protocol Criteria:  PASSED Reason: N/A    If all below requirements are met, send a 90-day supply with 1 refill per provider protocol.    Verify appointment with Endocrinology completed in the last 12 months or scheduled in the next 6 months.    Normal TSH result in the past 12 months   Review recent telephone encounters and mychart communications with patient to ensure a dose change has not occurred since last office visit that was not updated in the medication history list     Last completed office visit:6/19/2025 Elio Fuentes APRN   Last completed telemed visit: Visit date not found  Next scheduled Follow up: No future appointments.   Last TSH result:   TSH (S)   Date Value Ref Range Status   06/04/2016 3.14 0.34 - 5.60 uIU/mL Final     Comment:     Ambulatory patients with a serum TSH above 2.5  mIU/ml,when confirmed by a repeat TSH measurement,  may be in the early stages of thyroid failure  (subclinical hypothyroidism)especially if  thyroperoxidase antibodies are elevated.  Recommend repeat TSH  and thyroperoxidase  antibody testing, as clinically indicated.

## 2025-06-19 NOTE — PATIENT INSTRUCTIONS
A1C: 7.7% today --> previously was 7.5% on 11/11/2024  Blood glucose: 177 in clinic today    Medications:   - continue Jardiance 25mg daily in AM  - continue with Ozempic 2mg once weekly     Medtronic Pump 780G  Basal:  12A 1.70 --> 1.60  10A 1.60 --> 1.55  8P 1.60 --> 1.55    I:CR  12A 4.0  10A 3.5     Sensitivity 25  Active Insulin Time 2.5 hours   BG goal:   12A 120   6A 110  9P 120    -Continue to work on entering meal bolus 15mins prior to start of meal    Weight:  Wt Readings from Last 6 Encounters:   06/19/25 187 lb (84.8 kg)   11/11/24 188 lb 11.2 oz (85.6 kg)   06/07/24 203 lb (92.1 kg)   11/10/23 209 lb (94.8 kg)   06/29/23 214 lb (97.1 kg)   02/20/23 222 lb (100.7 kg)     A1C goal:  <7.0%    Blood sugar testing:  Continue using smart guard continuous glucometer     Blood sugar targets:  Before breakfast:  (preferably < 110)  Before meals: <150   2 hours after meals: <180 (preferably <150)     Call for persistent blood sugars < 75 or > 200

## 2025-06-19 NOTE — TELEPHONE ENCOUNTER
Endocrine Refill protocol for oral and injectable diabetic medications    Protocol Criteria:  PASSED  Reason: N/A    If all below requirements are met, send a 90-day supply with 1 refill per provider protocol.    Verify appointment with Endocrinology completed in the last 6 months or scheduled in the next 3 months.  Verify A1C has been completed within the last 6 months and is below 8.5%     Last completed office visit: 6/19/2025 Elio Fuentes APRN   Next scheduled Follow up: No future appointments.   Last A1c result: Last A1c value was 7.7% done 6/19/2025.

## 2025-06-19 NOTE — PROGRESS NOTES
Name: Gurpreet Dawkins  Date: 6/19/2025      Referring Physician: No ref. provider found       HISTORY OF PRESENT ILLNESS   Gurpreet Dawkins is a 59 year old male who presents for diabetes mellitus.  He has history of chronic pancreatitis s/p multiple ERCP and suspect that pancreatic damage is cause for DM.    Last ERCP was in 2013. It was noted that he has a cyst to pancreas, thus this is being monitored on an annual basis. He reports lack of pain sensation with each pancreatitis episode.     Patient has been feeling well. He continues to follow a low carb diet as much as able and staying active per usual. he has been compliant with taking all diabetes medications. Continues to struggle with entering meal boluses prior to start of meal as a result of busy work schedule.       Prior HbA, C or glycohemoglobin were 7.2% 6/2014; 7.9% 9/2014; 7.7% 12/2014; 8.1% 4/2015; 8.4% 6/2016; 9.1% 1/2017; 9.5% 7/2017; 9.3% 2/2018; 9.4% 9/2018; 7.8% 12/2018; 8.4% 9/2019; 8.3% 10/2020; 8.3% 4/2021; 9.3% 7/2021; 7.3% 1/2022; 8.7% 8/19/2022; 8.4% 2/2023; 7.3% 6/2023; 8.4% 11/10/2023; 7.9% 6/7/2024; 7.5% 11/11/2024; 7.7% at POC today;        Dietary compliance: fair;   -Eating carb based foods for breakfast most of the time  - eats lunch around 10am and snacks between lunch/dinner meal.   Feels comfortable with carb counting   - due to being very busy at work - he has been entering       Exercise: no   Polyuria/polydipsia: No  Blurred vision: No    Episodes of hypoglycemia: No  Blood Glucose:  Checking 4 times per day - using smart guard cgm with Medtronic pump   Reviewed Medtronic Download    Medications for DM  -ozempic 2mg once weekly - tolerating well; denies GI s/e   -Jardiance 25mg PO daily     Medtronic Pump 780G  Basal:  12A 1.70 --> 1.60  10A 1.60 --> 1.55  8P 1.60 --> 1.55    I:CR  12A 4.0  10A 3.5     Sensitivity 25  Active Insulin Time 2.5 hours   BG goal:   12A 120   6A 110  9P 120      Mounjaro was d/c'ed due to skin rash  to injection site areas         Average glucose: 196 mg/dl     In target range: (70-180mg/dl): 60%     High glucose targets: (> 180mg/dl): 30%     Very high glucose targets: (> 250mg/dl): 10%     Low glucose targets: (less than 70mg/dl): 0%     Very Low glucose targets: (< 54mg/dl ): 0%       Total daily dose: 44.3 units   Bolus amt/day: 19.1 units   Auto correction/day: 16.5 units   Auto basal/day: 25.2 units       Continuous Glucose Monitoring Interpretation    Gurpreet Dawkins has undergone continuous glucose monitoring with the personal [email or web address] continuous glucose monitor. The blood glucose tracings were evaluated for two weeks prior to office visit. His blood glucose tracings demonstrated postprandial hyperglycemia as a result of delayed meal bolus entry. He did experience hypoglycemia during the week of evaluation mostly occurring overnight or between meals.  As a result of his testing his medications were adjusted per below.       REVIEW OF SYSTEMS  Eyes: Diabetic retinopathy present: No            Most recent visit to eye doctor in last 12 months: yes 10/2024 - followed by Sally in [place]       CV: Cardiovascular disease present: No         Hypertension present: Yes         Hyperlipidemia present: Yes         Peripheral Vascular Disease present: No    : Nephropathy present: No    Neuro: Neuropathy present: Yes    Skin: Infection or ulceration: No    Osteoporosis: No    Thyroid disease: No      Medications:     Current Outpatient Medications:     NOVOLOG 100 UNIT/ML Injection Solution, INJECT UP  UNITS DAILY VIA INSULIN PUMP., Disp: 120 mL, Rfl: 0    LEVOTHYROXINE 100 MCG Oral Tab, TAKE 1 TABLET(100 MCG) BY MOUTH BEFORE BREAKFAST, Disp: 90 tablet, Rfl: 1    empagliflozin (JARDIANCE) 25 MG Oral Tab, Take 1 tablet (25 mg total) by mouth daily., Disp: 90 tablet, Rfl: 1    Insulin Pen Needle (PEN NEEDLES) 32G X 4 MM Does not apply Misc, 1 each 4 (four) times daily before meals and nightly.,  Disp: 100 each, Rfl: 0    insulin degludec 100 UNIT/ML Subcutaneous Solution Pen-injector, Inject 35 Units into the skin nightly., Disp: 15 mL, Rfl: 0    Syringe/Needle, Disp, (BD SYRINGE/NEEDLE) 25G X 5/8\" 3 ML Does not apply Misc, To use insulin U200 with medtronic pump, Disp: 30 each, Rfl: 1    semaglutide (OZEMPIC, 2 MG/DOSE,) 8 MG/3ML Subcutaneous Solution Pen-injector, Inject 2 mg into the skin once a week., Disp: 9 mL, Rfl: 1    LISINOPRIL 40 MG Oral Tab, TAKE 1 TABLET(40 MG) BY MOUTH DAILY( GENERIC EQUIVALENT FOR PRINIVIL, ZESTRIL), Disp: 30 tablet, Rfl: 5    ketoconazole 2 % External Cream, APPLY TOPICALLY TO THE AFFECTED AREA DAILY, Disp: 30 g, Rfl: 1    Accu-Chek FastClix Lancets Does not apply Misc, Use as directed to check blood sugar levels, Disp: 102 each, Rfl: 0    Accu-Chek FastClix Lancets Does not apply Misc, Lancing Device, Disp: 1 each, Rfl: 0    Lancets Misc. (ACCU-CHEK FASTCLIX LANCET) Does not apply Kit, Use as directed to check blood sugar, Disp: 1 kit, Rfl: 0    Pancrelipase, Lip-Prot-Amyl, (CREON) 00177-21596 units Oral Cap DR Particles, Take 3 capsules by mouth 3 (three) times daily with meals., Disp: 1080 capsule, Rfl: 1    Glucose Blood (ACCU-CHEK GUIDE) In Vitro Strip, Check 3 times per day, Disp: 300 strip, Rfl: 1    ATORVASTATIN 40 MG Oral Tab, TAKE 1 TABLET BY MOUTH AT  NIGHT, Disp: 30 tablet, Rfl: 11    Microlet Lancets Does not apply Misc, USE TO CHECK BLOOD SUGAR UP TO 8 TIMES PER DAY, Disp: 800 each, Rfl: 3    Blood Glucose Monitoring Suppl (CONTOUR NEXT MONITOR) w/Device Does not apply Kit, 1 each by Does not apply route daily., Disp: 1 kit, Rfl: 0    DULoxetine HCl 60 MG Oral Cap DR Particles, 1 capsule (60 mg total) daily., Disp: , Rfl:     Pancrelipase, Lip-Prot-Amyl, 28508-43717 units Oral Cap DR Particles, Take 2 capsules by mouth 3 (three) times daily with meals., Disp: , Rfl:     Cholecalciferol (VITAMIN D) 2000 UNITS Oral Cap, Take by mouth., Disp: , Rfl:     aspirin 81  MG Oral Tab, Take 1 tablet (81 mg total) by mouth daily., Disp: , Rfl:     Blood Glucose Monitoring Suppl ([employer] CONTOUR LINK MONITOR) W/DEVICE Does not apply Kit, , Disp: , Rfl:     Multiple Vitamins-Minerals (MULTIVITAMIN) Oral Liquid, Take  by mouth., Disp: , Rfl:     omega-3 fatty acids (FISH OIL) 1000 MG Oral Cap, Take  by mouth., Disp: , Rfl:      Allergies:   No Known Allergies    Social History:   Social History     Socioeconomic History    Marital status:    Tobacco Use    Smoking status: Former     Current packs/day: 0.00     Types: Cigarettes     Quit date: 2002     Years since quittin.8    Smokeless tobacco: Never   Substance and Sexual Activity    Alcohol use: Yes     Alcohol/week: 1.7 standard drinks of alcohol     Types: 2 Glasses of wine per week     Comment: once/month    Drug use: No   Other Topics Concern    Caffeine Concern Yes     Comment: 6cups coffee       Medical History:   Past Medical History:    Atrophic pancreas (HCC)    Diabetes mellitus (HCC)    Elevated liver function tests    Non-alcoholic fatty liver disease    Non-alcoholic fatty liver disease    Pancreatitis (HCC)    Type II or unspecified type diabetes mellitus without mention of complication, not stated as uncontrolled       Surgical history:   Past Surgical History:   Procedure Laterality Date    Ercp duct stent placement           PHYSICAL EXAM  /74   Pulse 99   Ht 5' 7\" (1.702 m)   Wt 187 lb (84.8 kg)   BMI 29.29 kg/m²     General Appearance:  alert, well developed, in no acute distress  Eyes:  normal conjunctivae, sclera., normal sclera and normal pupils  Throat/Neck: normal sound to voice.   Back: no kyphosis  Respiratory:  non-labored. no increased work of breathing.    Lymph Nodes:  No abnormal nodes noted  Skin:  normal moisture and skin texture  Hematologic:  no excessive bruising  Psychiatric:  oriented to time, self, and place      ASSESSMENT/PLAN:      1. Diabetes Mellitus Type 2,  Uncontrolled  -Uncontrolled, HgA1c 7.7% today   -Discussed importance of glycemic control to prevent complications of diabetes  -Discussed complications of diabetes include retinopathy, neuropathy, nephropathy and cardiovascular disease  -Discussed importance of SBGM  -Discussed importance of low CHO diet  - encouraged to cont to work on entering meal boluses prior to start of meal (15mins)    - continue with Jardiance 25mg PO daily in AM  - continue with ozempic 2mg once weekly   - pump settings adjusted per above         -Normotensive today   -normal lipids - on statin therapy   -Normal renal function   -UTD with optho   - normal foot exam on 11/11/2024        2. Hypothyroidism  -on Levothyroxine 75mcg PO daily  -Normal TFTs           RTC 3 months       6/19/2025  KASSY Watson

## 2025-07-07 ENCOUNTER — TELEPHONE (OUTPATIENT)
Dept: ENDOCRINOLOGY CLINIC | Facility: CLINIC | Age: 60
End: 2025-07-07

## 2025-07-07 NOTE — TELEPHONE ENCOUNTER
Received fax from [employer] attached is a cgm form placed in provider folder for review and signature.

## 2025-07-23 ENCOUNTER — TELEPHONE (OUTPATIENT)
Dept: ENDOCRINOLOGY CLINIC | Facility: CLINIC | Age: 60
End: 2025-07-23

## (undated) DIAGNOSIS — E10.65 UNCONTROLLED TYPE 1 DIABETES MELLITUS WITH HYPERGLYCEMIA (HCC): ICD-10-CM

## (undated) NOTE — MR AVS SNAPSHOT
[facility]  [address] [place], [place] [address] [id number] [phone]               Thank you for choosing us for your health care visit with Faith Helms MD.  We are glad to serve you and happy to provide you with this summary of your visit.   Ple escitalopram 10 MG Tabs   Commonly known as:  LEXAPRO           * Glucose Blood Strp   Check blood glucose level 5 times per day   Commonly known as:  CRISTIAN CONTOUR NEXT TEST           * Glucose Blood Strp   Use to check blood sugar 5 times daily as direc Hypothyroidism due to Hashimoto's thyroiditis [[id number]]      Results of Recent Testing     GLUCOSE BLOOD TEST      Component Value Standard Range & Units    GLUCOSE 195     Test Strip Lot # [id number] Numeric    Test Strip Expiration Date 12/31/17 Date Get your heart pumping – brisk walking, biking, swimming Even 10 minute increments are effective and add up over the week   2 ½ hours per week – spread out over time Use a mando to keep you motivated   Don’t forget strength training with weights and resist

## (undated) NOTE — LETTER
10/22/2018              [phone]         Dear Mari Cortés,    [id number] records indicate that the tests ordered for you by Yoana Chopra MD  have not been done.   If you have, in fact, already completed the test